# Patient Record
Sex: FEMALE | Race: WHITE | Employment: OTHER | URBAN - METROPOLITAN AREA
[De-identification: names, ages, dates, MRNs, and addresses within clinical notes are randomized per-mention and may not be internally consistent; named-entity substitution may affect disease eponyms.]

---

## 2017-01-18 ENCOUNTER — APPOINTMENT (OUTPATIENT)
Dept: GENERAL RADIOLOGY | Facility: CLINIC | Age: 67
End: 2017-01-18
Attending: EMERGENCY MEDICINE
Payer: COMMERCIAL

## 2017-01-18 ENCOUNTER — OFFICE VISIT (OUTPATIENT)
Dept: FAMILY MEDICINE | Facility: CLINIC | Age: 67
End: 2017-01-18
Payer: COMMERCIAL

## 2017-01-18 ENCOUNTER — HOSPITAL ENCOUNTER (EMERGENCY)
Facility: CLINIC | Age: 67
Discharge: HOME | End: 2017-01-18
Attending: EMERGENCY MEDICINE | Admitting: EMERGENCY MEDICINE
Payer: COMMERCIAL

## 2017-01-18 VITALS
HEART RATE: 131 BPM | HEIGHT: 59 IN | WEIGHT: 122 LBS | SYSTOLIC BLOOD PRESSURE: 144 MMHG | DIASTOLIC BLOOD PRESSURE: 60 MMHG | BODY MASS INDEX: 24.6 KG/M2 | OXYGEN SATURATION: 92 % | TEMPERATURE: 98 F

## 2017-01-18 VITALS
RESPIRATION RATE: 18 BRPM | OXYGEN SATURATION: 91 % | DIASTOLIC BLOOD PRESSURE: 81 MMHG | TEMPERATURE: 97.8 F | WEIGHT: 109.79 LBS | SYSTOLIC BLOOD PRESSURE: 150 MMHG | HEART RATE: 115 BPM | BODY MASS INDEX: 21.99 KG/M2

## 2017-01-18 DIAGNOSIS — R00.0 TACHYCARDIA: ICD-10-CM

## 2017-01-18 DIAGNOSIS — L03.039: ICD-10-CM

## 2017-01-18 DIAGNOSIS — R91.8 PULMONARY NODULES: ICD-10-CM

## 2017-01-18 DIAGNOSIS — L03.011 FELON OF FINGER OF RIGHT HAND: Primary | ICD-10-CM

## 2017-01-18 DIAGNOSIS — R03.0 ELEVATED BLOOD PRESSURE READING WITHOUT DIAGNOSIS OF HYPERTENSION: ICD-10-CM

## 2017-01-18 DIAGNOSIS — R09.81 NASAL CONGESTION: ICD-10-CM

## 2017-01-18 DIAGNOSIS — J06.9 ACUTE URI: ICD-10-CM

## 2017-01-18 DIAGNOSIS — R05.9 COUGH: ICD-10-CM

## 2017-01-18 PROCEDURE — 99213 OFFICE O/P EST LOW 20 MIN: CPT | Performed by: PHYSICIAN ASSISTANT

## 2017-01-18 PROCEDURE — 99283 EMERGENCY DEPT VISIT LOW MDM: CPT | Mod: 25

## 2017-01-18 PROCEDURE — 25000132 ZZH RX MED GY IP 250 OP 250 PS 637: Performed by: EMERGENCY MEDICINE

## 2017-01-18 PROCEDURE — 71020 XR CHEST 2 VW: CPT

## 2017-01-18 PROCEDURE — 10060 I&D ABSCESS SIMPLE/SINGLE: CPT

## 2017-01-18 PROCEDURE — 87070 CULTURE OTHR SPECIMN AEROBIC: CPT | Performed by: EMERGENCY MEDICINE

## 2017-01-18 RX ORDER — CEPHALEXIN 500 MG/1
500 CAPSULE ORAL ONCE
Status: COMPLETED | OUTPATIENT
Start: 2017-01-18 | End: 2017-01-18

## 2017-01-18 RX ORDER — CEPHALEXIN 500 MG/1
500 CAPSULE ORAL 4 TIMES DAILY
Qty: 40 CAPSULE | Refills: 0 | Status: SHIPPED | OUTPATIENT
Start: 2017-01-18 | End: 2017-01-28

## 2017-01-18 RX ADMIN — CEPHALEXIN 500 MG: 500 CAPSULE ORAL at 12:51

## 2017-01-18 ASSESSMENT — ENCOUNTER SYMPTOMS: SHORTNESS OF BREATH: 0

## 2017-01-18 NOTE — ED AVS SNAPSHOT
[facility] Department    [address]    [facility] [address]    Phone:  [phone]    Fax:  [phone]                                       Frida Iglesias   MRN: [id number]    Department:  [facility] Emergency Department   Date of Visit:  1/18/2017           Patient Information     Date Of Birth          1950        Your diagnoses for this visit were:     Paronychia, unspecified laterality     Subungual abscess of finger, unspecified laterality     Acute URI     Pulmonary nodules        You were seen by Herrera Abrams MD.      Follow-up Information     Follow up with Sean Alexandra MD. Call in 3 days.    Specialty:  Orthopedics    Contact information:    [facility]  [address]  [facility] [address]  [phone]          Discharge Instructions         Paronychia of the Finger or Toe  Paronychia is an infection near a fingernail or toenail. It usually occurs when an opening in the cuticle or an ingrown toenail lets bacteria under the skin.  The infection will need to be drained if pus is present. If the infection has been caught early, you may need only antibiotic treatment. Healing will take about 1 to 2 weeks.  Home care  Follow these guidelines when caring for yourself at home:    Clean and soak the toe or finger. Do this twice a day for the first 3 days. To do so:    Soak your foot or hand in a tub of warm water for 5 minutes. Or hold your toe or finger under a faucet of warm running water for 5 minutes.    Clean any crust away with soap and water using a cotton swab.    Put antibiotic ointment on the infected area.    Change the dressing daily or any time it becomes soiled.    If you were given antibiotics, take them as directed until they are all gone.    If your infection is on a toe, wear comfortable shoes with a lot of toe room. You can also wear open-toed sandals while your toe heals.    You may use acetaminophen or  ibuprofen to help with pain, unless another medicine was prescribed. If you have chronic liver or kidney disease, talk with your health care provider before using these medicines. Also talk with your provider if you've had a stomach ulcer or GI bleeding.  Prevention  The following can prevent paronychia:    Trim or push down the skin around the nail (cuticle).    Don't bite your nails.    Don't suck on your thumbs or fingers.  Follow-up care  Follow up with your health care provider, or as advised.  When to seek medical advice  Call your health care provider right away if any of these occur:    Redness, pain, or swelling of the finger or toe gets worse    Red streaks in the skin leading away from the wound    Pus or fluid drain from the nail area    Fever of 100.4 F (38 C) or higher, or as directed by your health care provider    4469-9218 [employer]. [address] [place] [address]. All rights reserved. This information is not intended as a substitute for professional medical care. Always follow your healthcare professional's instructions.        Discharge Instructions  Cellulitis    Cellulitis is an infection of the skin that occurs when bacteria enter the skin.   Symptoms are generally redness, swelling, warmth and pain.  Your infection appeared to be appropriate to treat at home with antibiotics.  However, sometimes your infection may be worse than it seemed at first, or may worsen with time. If you have new or worse symptoms, you may need to be seen again in the Emergency Department or by your primary doctor.    Return to the Emergency Department if:    The redness, pain, or swelling gets a lot worse.  If the red area was marked, return if it is red beyond the marked area.    You are unable to get your antibiotics, or are vomiting them up or you can t take them.    You are feeling more ill, weak or lightheaded.    You start to run a new fever (temperature >101).    Anything else about  "the infection worries or concerns you.  Treatment:    Start your antibiotics right away, and take them as prescribed. Be sure to finish the whole prescription, even if you are better.    Apply a heating pad, warm packs, or warm water soaks to the infected area for 15 minutes at a time, at least 3 times a day. Do not use a heating pad on your feet or legs if you have diabetes. Do not sleep with a heating pad on, since this can cause burns or skin injury.    Rest your injured area for at least 1-2 days. After that you may start using your extremity again as long as there is not too much pain.     Raise the injured area above the level of your heart as much as possible in the first 1-2 days.    Tylenol  (acetaminophen), Motrin  (ibuprofen), or Advil  (ibuprofen) may help may help reduce pain and fever and may help you feel more comfortable. Be sure to read and follow the package directions, and ask your doctor if you have questions.    Follow-up with your doctor:    Re-check in clinic within 2-3 days.  Probiotics: If you have been given an antibiotic, you may want to also take a probiotic pill or eat yogurt with live cultures. Probiotics have \"good bacteria\" to help your intestines stay healthy. Studies have shown that probiotics help prevent diarrhea and other intestine problems (including C. diff infection) when you take antibiotics. You can buy these without a prescription in the pharmacy section of the store.     If you were given a prescription for medicine here today, be sure to read all of the information (including the package insert) that comes with your prescription.  This will include important information about the medicine, its side effects, and any warnings that you need to know about.  The pharmacist who fills the prescription can provide more information and answer questions you may have about the medicine.  If you have questions or concerns that the pharmacist cannot address, please call or return to " the Emergency Department.     Opioid Medication Information    Pain medications are among the most commonly prescribed medicines, so we are including this information for all our patients. If you did not receive pain medication or get a prescription for pain medicine, you can ignore it.     You may have been given a prescription for an opioid (narcotic) pain medicine and/or have received a pain medicine while here in the Emergency Department. These medicines can make you drowsy or impaired. You must not drive, operate dangerous equipment, or engage in any other dangerous activities while taking these medications. If you drive while taking these medications, you could be arrested for DUI, or driving under the influence. Do not drink any alcohol while you are taking these medications.     Opioid pain medications can cause addiction. If you have a history of chemical dependency of any type, you are at a higher risk of becoming addicted to pain medications.  Only take these prescribed medications to treat your pain when all other options have been tried. Take it for as short a time and as few doses as possible. Store your pain pills in a secure place, as they are frequently stolen and provide a dangerous opportunity for children or visitors in your house to start abusing these powerful medications. We will not replace any lost or stolen medicine.  As soon as your pain is better, you should flush all your remaining medication.     Many prescription pain medications contain Tylenol  (acetaminophen), including Vicodin , Tylenol #3 , Norco , Lortab , and Percocet .  You should not take any extra pills of Tylenol  if you are using these prescription medications or you can get very sick.  Do not ever take more than 3000 mg of acetaminophen in any 24 hour period.    All opioids tend to cause constipation. Drink plenty of water and eat foods that have a lot of fiber, such as fruits, vegetables, prune juice, apple juice and high  fiber cereal.  Take a laxative if you don t move your bowels at least every other day. Miralax , Milk of Magnesia, Colace , or Senna  can be used to keep you regular.      Remember that you can always come back to the Emergency Department if you are not able to see your regular doctor in the amount of time listed above, if you get any new symptoms, or if there is anything that worries you.        Discharge References/Attachments     PULMONARY NODULE, SOLITARY (ENGLISH)      24 Hour Appointment Hotline       To make an appointment at any [facility], call [phone] ([phone]). If you don't have a family doctor or clinic, we will help you find one. [facility] are conveniently located to serve the needs of you and your family.             Review of your medicines      START taking        Dose / Directions Last dose taken    cephALEXin 500 MG capsule   Commonly known as:  KEFLEX   Dose:  500 mg   Quantity:  40 capsule        Take 1 capsule (500 mg) by mouth 4 times daily for 10 days   Refills:  0                Prescriptions were sent or printed at these locations (1 Prescription)                   Other Prescriptions                Printed at Department/Unit printer (1 of 1)         cephALEXin (KEFLEX) 500 MG capsule                Procedures and tests performed during your visit     Chest XR,  PA & LAT    Wound Culture Aerobic Bacterial      Orders Needing Specimen Collection     None      Pending Results     Date and Time Order Name Status Description    1/18/2017 1240 Wound Culture Aerobic Bacterial In process             Pending Culture Results     Date and Time Order Name Status Description    1/18/2017 1240 Wound Culture Aerobic Bacterial In process        Test Results from your hospital stay           1/18/2017  1:01 PM - Interface, Flexilab Results         1/18/2017  1:38 PM - Interface, Radiant Ib      Narrative     CHEST TWO VIEWS January 18, 2017 1:26 PM     HISTORY:  Cough.    COMPARISON: None.        Impression     IMPRESSION: 5 mm calcified granuloma in the left midlung. Chest  otherwise negative.     CHARO MARTINS MD                Clinical Quality Measure: Blood Pressure Screening     Your blood pressure was checked while you were in the emergency department today. The last reading we obtained was  BP: 160/80 mmHg . Please read the guidelines below about what these numbers mean and what you should do about them.  If your systolic blood pressure (the top number) is less than 120 and your diastolic blood pressure (the bottom number) is less than 80, then your blood pressure is normal. There is nothing more that you need to do about it.  If your systolic blood pressure (the top number) is 120-139 or your diastolic blood pressure (the bottom number) is 80-89, your blood pressure may be higher than it should be. You should have your blood pressure rechecked within a year by a primary care provider.  If your systolic blood pressure (the top number) is 140 or greater or your diastolic blood pressure (the bottom number) is 90 or greater, you may have high blood pressure. High blood pressure is treatable, but if left untreated over time it can put you at risk for heart attack, stroke, or kidney failure. You should have your blood pressure rechecked by a primary care provider within the next 4 weeks.  If your provider in the emergency department today gave you specific instructions to follow-up with your doctor or provider even sooner than that, you should follow that instruction and not wait for up to 4 weeks for your follow-up visit.        Thank you for choosing [facility]       Thank you for choosing [facility] for your care. Our goal is always to provide you with excellent care. Hearing back from our patients is one way we can continue to improve our services. Please take a few minutes to complete the written survey that you may receive in the mail after you visit with us. Thank  "you!        [email or web address] Information     [email or web address] lets you send messages to your doctor, view your test results, renew your prescriptions, schedule appointments and more. To sign up, go to [email or web address] . Click on \"Log in\" on the left side of the screen, which will take you to the Welcome page. Then click on \"Sign up Now\" on the right side of the page.     You will be asked to enter the access code listed below, as well as some personal information. Please follow the directions to create your username and password.     Your access code is: [email or web address]  Expires: 2017 12:48 PM     Your access code will  in 90 days. If you need help or a new code, please call your [facility] clinic or [phone].        Care EveryWhere ID     This is your Care EveryWhere ID. This could be used by other organizations to access your [facility] medical records  [id number]        After Visit Summary       This is your record. Keep this with you and show to your community pharmacist(s) and doctor(s) at your next visit.                  "

## 2017-01-18 NOTE — DISCHARGE INSTRUCTIONS
Paronychia of the Finger or Toe  Paronychia is an infection near a fingernail or toenail. It usually occurs when an opening in the cuticle or an ingrown toenail lets bacteria under the skin.  The infection will need to be drained if pus is present. If the infection has been caught early, you may need only antibiotic treatment. Healing will take about 1 to 2 weeks.  Home care  Follow these guidelines when caring for yourself at home:    Clean and soak the toe or finger. Do this twice a day for the first 3 days. To do so:    Soak your foot or hand in a tub of warm water for 5 minutes. Or hold your toe or finger under a faucet of warm running water for 5 minutes.    Clean any crust away with soap and water using a cotton swab.    Put antibiotic ointment on the infected area.    Change the dressing daily or any time it becomes soiled.    If you were given antibiotics, take them as directed until they are all gone.    If your infection is on a toe, wear comfortable shoes with a lot of toe room. You can also wear open-toed sandals while your toe heals.    You may use acetaminophen or ibuprofen to help with pain, unless another medicine was prescribed. If you have chronic liver or kidney disease, talk with your health care provider before using these medicines. Also talk with your provider if you've had a stomach ulcer or GI bleeding.  Prevention  The following can prevent paronychia:    Trim or push down the skin around the nail (cuticle).    Don't bite your nails.    Don't suck on your thumbs or fingers.  Follow-up care  Follow up with your health care provider, or as advised.  When to seek medical advice  Call your health care provider right away if any of these occur:    Redness, pain, or swelling of the finger or toe gets worse    Red streaks in the skin leading away from the wound    Pus or fluid drain from the nail area    Fever of 100.4 F (38 C) or higher, or as directed by your health care provider    [id number]  [employer]. [address] [place] [address], [place] [address]. All rights reserved. This information is not intended as a substitute for professional medical care. Always follow your healthcare professional's instructions.        Discharge Instructions  Cellulitis    Cellulitis is an infection of the skin that occurs when bacteria enter the skin.   Symptoms are generally redness, swelling, warmth and pain.  Your infection appeared to be appropriate to treat at home with antibiotics.  However, sometimes your infection may be worse than it seemed at first, or may worsen with time. If you have new or worse symptoms, you may need to be seen again in the Emergency Department or by your primary doctor.    Return to the Emergency Department if:    The redness, pain, or swelling gets a lot worse.  If the red area was marked, return if it is red beyond the marked area.    You are unable to get your antibiotics, or are vomiting them up or you can t take them.    You are feeling more ill, weak or lightheaded.    You start to run a new fever (temperature >101).    Anything else about the infection worries or concerns you.  Treatment:    Start your antibiotics right away, and take them as prescribed. Be sure to finish the whole prescription, even if you are better.    Apply a heating pad, warm packs, or warm water soaks to the infected area for 15 minutes at a time, at least 3 times a day. Do not use a heating pad on your feet or legs if you have diabetes. Do not sleep with a heating pad on, since this can cause burns or skin injury.    Rest your injured area for at least 1-2 days. After that you may start using your extremity again as long as there is not too much pain.     Raise the injured area above the level of your heart as much as possible in the first 1-2 days.    Tylenol  (acetaminophen), Motrin  (ibuprofen), or Advil  (ibuprofen) may help may help reduce pain and fever and may help you feel more comfortable. Be  "sure to read and follow the package directions, and ask your doctor if you have questions.    Follow-up with your doctor:    Re-check in clinic within 2-3 days.  Probiotics: If you have been given an antibiotic, you may want to also take a probiotic pill or eat yogurt with live cultures. Probiotics have \"good bacteria\" to help your intestines stay healthy. Studies have shown that probiotics help prevent diarrhea and other intestine problems (including C. diff infection) when you take antibiotics. You can buy these without a prescription in the pharmacy section of the store.     If you were given a prescription for medicine here today, be sure to read all of the information (including the package insert) that comes with your prescription.  This will include important information about the medicine, its side effects, and any warnings that you need to know about.  The pharmacist who fills the prescription can provide more information and answer questions you may have about the medicine.  If you have questions or concerns that the pharmacist cannot address, please call or return to the Emergency Department.     Opioid Medication Information    Pain medications are among the most commonly prescribed medicines, so we are including this information for all our patients. If you did not receive pain medication or get a prescription for pain medicine, you can ignore it.     You may have been given a prescription for an opioid (narcotic) pain medicine and/or have received a pain medicine while here in the Emergency Department. These medicines can make you drowsy or impaired. You must not drive, operate dangerous equipment, or engage in any other dangerous activities while taking these medications. If you drive while taking these medications, you could be arrested for DUI, or driving under the influence. Do not drink any alcohol while you are taking these medications.     Opioid pain medications can cause addiction. If you have " a history of chemical dependency of any type, you are at a higher risk of becoming addicted to pain medications.  Only take these prescribed medications to treat your pain when all other options have been tried. Take it for as short a time and as few doses as possible. Store your pain pills in a secure place, as they are frequently stolen and provide a dangerous opportunity for children or visitors in your house to start abusing these powerful medications. We will not replace any lost or stolen medicine.  As soon as your pain is better, you should flush all your remaining medication.     Many prescription pain medications contain Tylenol  (acetaminophen), including Vicodin , Tylenol #3 , Norco , Lortab , and Percocet .  You should not take any extra pills of Tylenol  if you are using these prescription medications or you can get very sick.  Do not ever take more than 3000 mg of acetaminophen in any 24 hour period.    All opioids tend to cause constipation. Drink plenty of water and eat foods that have a lot of fiber, such as fruits, vegetables, prune juice, apple juice and high fiber cereal.  Take a laxative if you don t move your bowels at least every other day. Miralax , Milk of Magnesia, Colace , or Senna  can be used to keep you regular.      Remember that you can always come back to the Emergency Department if you are not able to see your regular doctor in the amount of time listed above, if you get any new symptoms, or if there is anything that worries you.

## 2017-01-18 NOTE — PROGRESS NOTES
SUBJECTIVE:                                                    Frida Iglesias is a 66 year old female who presents to clinic today for the following health issues:      Acute Illness   Acute illness concerns: cough, congestion  Onset: 9 days     Fever: no    Chills/Sweats: YES, chills    Headache (location?): YES    Sinus Pressure:no    Conjunctivitis:  no    Ear Pain: no    Rhinorrhea: no    Congestion: YES    Sore Throat: no     Cough: YES    Wheeze: YES    Decreased Appetite: no    Nausea: YES    Vomiting: no    Diarrhea:  no    Dysuria/Freq.: no    Fatigue/Achiness: YES    Sick/Strep Exposure: no     Therapies Tried and outcome:       Concern - started a couple of days ago - thumb very painful, swollen, discolored. Feeling hot/chilled, but no known fevers.  Pain is keeping her up at night.  Is a ruvalcaba and injured nail during summer about 6 months ago. Nail has fallen off a couple times since, but ulnar aspect never seems to totally adhere and there is a white spot under nail that seems to come/go.  This time white area has enlarged without improvement. Sx crescendoing.     Onset:     Description:   Nail had previously fallen off in the summer but in the past couple of days it has become very painful, swollen, discolored    Intensity:     Progression of Symptoms:  worsening    Accompanying Signs & Symptoms:         Previous history of similar problem:       Precipitating factors:   Worsened by:     Alleviating factors:  Improved by:        Therapies Tried and outcome: warm soaks    Problem list and histories reviewed & adjusted, as indicated.  Additional history: as documented  Problem list, Medication list, Allergies, and Medical/Social/Surgical histories reviewed in [facility] and updated as appropriate.    No current outpatient prescriptions on file.     No current facility-administered medications for this visit.      No Known Allergies     O:  /60 mmHg  Pulse 131  Temp(Src) 98  F (36.7  C) (Oral)  Ht  "4' 11.25\" (1.505 m)  Wt 122 lb (55.339 kg)  BMI 24.43 kg/m2  SpO2 92%  Breastfeeding? No  Constitutional: Pt is a pleasant 67 yo female who appears mildly anxious.  MSK: shows me an obviously swollen, red and hot to touch distal R thumb. There is yellow-green pustular fluid encompassing the entire half of the ulnar aspect her nail. There is ecchymosis along adjacent nail border from tip of ulnar side to inferior aspect of nail. Distal thumb skin is tight and palpation of distal phalanx is very tender. Continues to be tender to palpation of IPJ, but less so. Also reports she can feel pain radiation to webspace and thenar eminence feels a little tingly at times.    A/P:    ICD-10-CM    1. Felon of finger of right hand L03.011    2. Tachycardia R00.0    3. Elevated blood pressure reading without diagnosis of hypertension R03.0    4. Cough R05    5. Nasal congestion R09.81    After finger examination further exam was not completed and I contacted the ED given concerns that this needs surgical I&D.  Concerns of more sinister brewing process in light of tachycardia and elevated BP as well, but she is afebrile.  Spoke with Dr. Chuns at [facility] who agrees with ED evaluation and further treatment and can also evaluate her URI sx at time of same.  Could give consideration to further consult with hand surgeon if indicated at that time.  Pt in agreement with plan and will present by private car.    Electronically Signed By: Sobeida Iraheta PA-C        "

## 2017-01-18 NOTE — ED NOTES
ABC's intact.  Alert and oriented x4.    Pt states she has a history of losing L thumb nail.  Starting yesterday, her L thumb began to swell and throb.  Thumb appears swollen and reddened with ecchymotic area near 1st joint.  CMS intact.

## 2017-01-18 NOTE — ED PROVIDER NOTES
History     Chief Complaint:  Hand Pain      HPI   Frida Iglesias is a 66 year old female who presents with left hand pain. The patient reports yesterday she developed swelling to her left thumb which has been progressively worsening. Patient was concerned for her symptoms today which prompts her visit. She notes she has lost the nail on this finger in the past. She denies any recent injury. She also notes of a cough starting last week which has been improving. She denies chest pain or shortness of breath.     Allergies:  The patient has no known allergies to medications.      Medications:    The patient is not currently taking any prescribed medications.     Past Medical History:    The patient does not have any pertinent past medical history.     Past Surgical History:    The patient has no pertinent surgical history.     Family History:  The patient has no pertinent family history.    Social History:  .  The patient is a former smoker, 1.50 ppd for 10 years.     Review of Systems   Respiratory: Negative for shortness of breath.    Cardiovascular: Negative for chest pain.   Skin:        Positive for left thumb swelling and pain.   All other systems reviewed and are negative.    Physical Exam     Patient Vitals for the past 24 hrs:   BP Temp Temp src Pulse Resp SpO2 Weight   01/18/17 1315 - - - - - 94 % -   01/18/17 1302 - - - - - 94 % -   01/18/17 1300 160/80 mmHg - - - - - -   01/18/17 1116 (!) 166/9 mmHg 97.8  F (36.6  C) Oral 115 18 92 % 49.8 kg (109 lb 12.6 oz)        Physical Exam  Constitutional:  Oriented to person, place, and time. Appears well-developed.      No distress.   HENT:   Head:    Normocephalic.    Eyes:    EOM are normal.   Neck:    Neck supple.    Musculoskeletal:  2+ distal pulses. She has a right thumb medial subungual abscess paronychia just proximal to nail, ecchymosis noted. Erythema circumferential around thumb with swelling.   Neurological:   Alert and oriented to person,  place, and time.           Moves all 4 extremities spontaneously. Left thumb neurovascularly intact.   Skin:    Paroncyhia, subungual abscess, and erythema of right thumb..    Emergency Department Course   Laboratory:  Wound culture: in process    Procedures:  Paronychia incision & drainage     INDICATION: paronychia    LOCATION: right thumb    CONSENT: The patient was verbally consented regarding the risks, benefits of the procedure.    TECHNIQUE:  After sterile prep with betadine, a paronychia incision (along the margin of the nail and lateral nail fold at the areas of maximum swelling and fluctuance) was made and loculations were disrupted, followed by an incision at the proximal and lateral aspect of the nail fold to faciliate drainage of the abscess. The wound was irrigated and soaked in copious tap water. The patient tolerated the procedure well without difficulty.  There were no complications.    Nail trephination  Indication: subungal abscess  The risks and benefits of the procedure were discussed with the patient.   Narrative: The nail was cleaned with an alcohol swab.  An cautery was used to trephinate the nail.  A satisfactory amount of purulent drainage was expressed.  Pain was improved following the procedure.    Interventions:  Keflex 500 mg tablet    Emergency Department Course:  Nursing notes and vitals reviewed.  I performed an exam of the patient as documented above.     The procedure detailed above was performed on the patient and she tolerated this well.     Patient swabbed. This was sent to the lab for further testing.    The patient received the intervention(s) above.     The above imaging study(s) were ordered with results noted above.     Findings and plan explained to the Patient. Patient discharged home with instructions regarding supportive care, medications, and reasons to return. The importance of close follow-up was reviewed.     Impression & Plan    Medical Decision Making:  Frida  Kelsie is a 66 year old female that came in complaining of redness and swelling to right thumb. She does have a paronychia with subungual abscess with surrounded cellulitis. She does have a hematoma just proximal to her nail. She is afebrile and well appearing here. No crepitus. No signs of necrotizing fascitis and I would highly doubt this. I did end up draining her paronychia and subungual abscess, see procedute notes above, with wond cultures pending. Since she has surrounding cellulitis she will be started on keflex. Due to unusual nature and cellulitis I am going to have her follow up with hand surgery for reevlaaution.     Additionally, patient was noted to have oxygen sat's of 92%. A chest x-ray was obtained which shows a 5 mm calcified granuloma in the left mid lung but otherwise unremarkable. The findings were discussed with the patient and I advised her to follow up as an outpatient regarding this. She is asymptomatic and denies feeling sob or having any chest pain. She is told to return here for any expanding redness, fever, chest pain, shortness of breath, worsening pain, or other concerns.     Diagnosis:    ICD-10-CM    1. Paronychia, unspecified laterality L03.019 Wound Culture Aerobic Bacterial   2. Subungual abscess of finger, unspecified laterality L03.039    3. Acute URI J06.9    4. Pulmonary nodules R91.8        Disposition:  Home. Follow up with PMD and orthopedics as warranted.    Discharge Medications:  New Prescriptions    CEPHALEXIN (KEFLEX) 500 MG CAPSULE    Take 1 capsule (500 mg) by mouth 4 times daily for 10 days         Joselito GUALLPA, am serving as a scribe at 1:19 PM on 1/18/2017 to document services personally performed by Dr. Abrams, based on my observations and the provider's statements to me.    [facility] EMERGENCY DEPARTMENT        Herrera Abrams MD  01/18/17 7805

## 2017-01-18 NOTE — ED AVS SNAPSHOT
[facility] Emergency Department    [address]    [facility] [address]    Phone:  [phone]    Fax:  [phone]                                       Frida Iglesias   MRN: [id number]    Department:  [facility] Emergency Department   Date of Visit:  1/18/2017           After Visit Summary Signature Page     I have received my discharge instructions, and my questions have been answered. I have discussed any challenges I see with this plan with the nurse or doctor.    ..........................................................................................................................................  Patient/Patient Representative Signature      ..........................................................................................................................................  Patient Representative Print Name and Relationship to Patient    ..................................................               ................................................  Date                                            Time    ..........................................................................................................................................  Reviewed by Signature/Title    ...................................................              ..............................................  Date                                                            Time

## 2017-01-21 ENCOUNTER — HOSPITAL ENCOUNTER (EMERGENCY)
Facility: CLINIC | Age: 67
Discharge: HOME | End: 2017-01-21
Attending: EMERGENCY MEDICINE | Admitting: EMERGENCY MEDICINE
Payer: COMMERCIAL

## 2017-01-21 VITALS
DIASTOLIC BLOOD PRESSURE: 63 MMHG | HEART RATE: 92 BPM | BODY MASS INDEX: 22.13 KG/M2 | HEIGHT: 59 IN | SYSTOLIC BLOOD PRESSURE: 106 MMHG | WEIGHT: 109.79 LBS | TEMPERATURE: 98 F | OXYGEN SATURATION: 95 % | RESPIRATION RATE: 18 BRPM

## 2017-01-21 DIAGNOSIS — L03.012 PARONYCHIA, LEFT: ICD-10-CM

## 2017-01-21 LAB
BACTERIA SPEC CULT: NORMAL
MICRO REPORT STATUS: NORMAL
SPECIMEN SOURCE: NORMAL

## 2017-01-21 PROCEDURE — 10060 I&D ABSCESS SIMPLE/SINGLE: CPT

## 2017-01-21 PROCEDURE — 99283 EMERGENCY DEPT VISIT LOW MDM: CPT | Mod: 25

## 2017-01-21 RX ORDER — GINSENG 100 MG
CAPSULE ORAL
Status: DISCONTINUED
Start: 2017-01-21 | End: 2017-01-21 | Stop reason: HOSPADM

## 2017-01-21 ASSESSMENT — ENCOUNTER SYMPTOMS
FEVER: 0
JOINT SWELLING: 1
ARTHRALGIAS: 1

## 2017-01-21 NOTE — ED NOTES
Left thumb pain, swelling.  Was seen here a few days ago, started on antibiotics, not getting better.  ABCDs intact.

## 2017-01-21 NOTE — DISCHARGE INSTRUCTIONS
Paronychia of the Finger or Toe  Paronychia is an infection near a fingernail or toenail. It usually occurs when an opening in the cuticle or an ingrown toenail lets bacteria under the skin.  The infection will need to be drained if pus is present. If the infection has been caught early, you may need only antibiotic treatment. Healing will take about 1 to 2 weeks.  Home care  Follow these guidelines when caring for yourself at home:    Clean and soak the toe or finger. Do this twice a day for the first 3 days. To do so:    Soak your foot or hand in a tub of warm water for 5 minutes. Or hold your toe or finger under a faucet of warm running water for 5 minutes.    Clean any crust away with soap and water using a cotton swab.    Put antibiotic ointment on the infected area.    Change the dressing daily or any time it becomes soiled.    If you were given antibiotics, take them as directed until they are all gone.    If your infection is on a toe, wear comfortable shoes with a lot of toe room. You can also wear open-toed sandals while your toe heals.    You may use acetaminophen or ibuprofen to help with pain, unless another medicine was prescribed. If you have chronic liver or kidney disease, talk with your health care provider before using these medicines. Also talk with your provider if you've had a stomach ulcer or GI bleeding.  Prevention  The following can prevent paronychia:    Trim or push down the skin around the nail (cuticle).    Don't bite your nails.    Don't suck on your thumbs or fingers.  Follow-up care  Follow up with your health care provider, or as advised.  When to seek medical advice  Call your health care provider right away if any of these occur:    Redness, pain, or swelling of the finger or toe gets worse    Red streaks in the skin leading away from the wound    Pus or fluid drain from the nail area    Fever of 100.4 F (38 C) or higher, or as directed by your health care provider    1625-9954  [employer], "[employer]. [address] [place] [address], [place] [address]. All rights reserved. This information is not intended as a substitute for professional medical care. Always follow your healthcare professional's instructions.

## 2017-01-21 NOTE — ED AVS SNAPSHOT
[facility] Emergency Department    [address]    [facility] [address]    Phone:  [phone]    Fax:  [phone]                                       Frida Iglesias   MRN: [id number]    Department:  [facility] Emergency Department   Date of Visit:  1/21/2017           After Visit Summary Signature Page     I have received my discharge instructions, and my questions have been answered. I have discussed any challenges I see with this plan with the nurse or doctor.    ..........................................................................................................................................  Patient/Patient Representative Signature      ..........................................................................................................................................  Patient Representative Print Name and Relationship to Patient    ..................................................               ................................................  Date                                            Time    ..........................................................................................................................................  Reviewed by Signature/Title    ...................................................              ..............................................  Date                                                            Time

## 2017-01-21 NOTE — ED AVS SNAPSHOT
[facility] Emergency Department    [address] [place] [address]    Phone:  [phone]    Fax:  [phone]                                       Frida Iglesias   MRN: [id number]    Department:  [facility] Emergency Department   Date of Visit:  1/21/2017           Patient Information     Date Of Birth          1950        Your diagnoses for this visit were:     Paronychia, left        You were seen by Kranthi Diop MD.      Follow-up Information     Follow up with hand surgeon .    Why:  as scheduled        Discharge Instructions         Paronychia of the Finger or Toe  Paronychia is an infection near a fingernail or toenail. It usually occurs when an opening in the cuticle or an ingrown toenail lets bacteria under the skin.  The infection will need to be drained if pus is present. If the infection has been caught early, you may need only antibiotic treatment. Healing will take about 1 to 2 weeks.  Home care  Follow these guidelines when caring for yourself at home:    Clean and soak the toe or finger. Do this twice a day for the first 3 days. To do so:    Soak your foot or hand in a tub of warm water for 5 minutes. Or hold your toe or finger under a faucet of warm running water for 5 minutes.    Clean any crust away with soap and water using a cotton swab.    Put antibiotic ointment on the infected area.    Change the dressing daily or any time it becomes soiled.    If you were given antibiotics, take them as directed until they are all gone.    If your infection is on a toe, wear comfortable shoes with a lot of toe room. You can also wear open-toed sandals while your toe heals.    You may use acetaminophen or ibuprofen to help with pain, unless another medicine was prescribed. If you have chronic liver or kidney disease, talk with your health care provider before using these medicines. Also talk with your provider if you've had a stomach ulcer or GI  bleeding.  Prevention  The following can prevent paronychia:    Trim or push down the skin around the nail (cuticle).    Don't bite your nails.    Don't suck on your thumbs or fingers.  Follow-up care  Follow up with your health care provider, or as advised.  When to seek medical advice  Call your health care provider right away if any of these occur:    Redness, pain, or swelling of the finger or toe gets worse    Red streaks in the skin leading away from the wound    Pus or fluid drain from the nail area    Fever of 100.4 F (38 C) or higher, or as directed by your health care provider    2655-7141 [employer]. [address] [place] [address], [place] [address]. All rights reserved. This information is not intended as a substitute for professional medical care. Always follow your healthcare professional's instructions.          24 Hour Appointment Hotline       To make an appointment at any [facility], call [phone] ([phone]). If you don't have a family doctor or clinic, we will help you find one. [facility] clinics are conveniently located to serve the needs of you and your family.             Review of your medicines      Our records show that you are taking the medicines listed below. If these are incorrect, please call your family doctor or clinic.        Dose / Directions Last dose taken    cephALEXin 500 MG capsule   Commonly known as:  KEFLEX   Dose:  500 mg   Quantity:  40 capsule        Take 1 capsule (500 mg) by mouth 4 times daily for 10 days   Refills:  0                Orders Needing Specimen Collection     None      Pending Results     No orders found from 1/20/2017 to 1/22/2017.            Pending Culture Results     No orders found from 1/20/2017 to 1/22/2017.             Test Results from your hospital stay            Clinical Quality Measure: Blood Pressure Screening     Your blood pressure was checked while you were in the emergency department today. The last reading we  "obtained was  BP: 149/89 mmHg . Please read the guidelines below about what these numbers mean and what you should do about them.  If your systolic blood pressure (the top number) is less than 120 and your diastolic blood pressure (the bottom number) is less than 80, then your blood pressure is normal. There is nothing more that you need to do about it.  If your systolic blood pressure (the top number) is 120-139 or your diastolic blood pressure (the bottom number) is 80-89, your blood pressure may be higher than it should be. You should have your blood pressure rechecked within a year by a primary care provider.  If your systolic blood pressure (the top number) is 140 or greater or your diastolic blood pressure (the bottom number) is 90 or greater, you may have high blood pressure. High blood pressure is treatable, but if left untreated over time it can put you at risk for heart attack, stroke, or kidney failure. You should have your blood pressure rechecked by a primary care provider within the next 4 weeks.  If your provider in the emergency department today gave you specific instructions to follow-up with your doctor or provider even sooner than that, you should follow that instruction and not wait for up to 4 weeks for your follow-up visit.        Thank you for choosing [facility]       Thank you for choosing [facility] for your care. Our goal is always to provide you with excellent care. Hearing back from our patients is one way we can continue to improve our services. Please take a few minutes to complete the written survey that you may receive in the mail after you visit with us. Thank you!        [employer] Information     [employer] lets you send messages to your doctor, view your test results, renew your prescriptions, schedule appointments and more. To sign up, go to [email or web address] . Click on \"Log in\" on the left side of the screen, which will take you to the Welcome page. Then click on \"Sign up Now\" on " the right side of the page.     You will be asked to enter the access code listed below, as well as some personal information. Please follow the directions to create your username and password.     Your access code is: [email or web address]  Expires: 2017 12:48 PM     Your access code will  in 90 days. If you need help or a new code, please call your [facility] clinic or [phone].        Care EveryWhere ID     This is your Care EveryWhere ID. This could be used by other organizations to access your [facility] medical records  [id number]        After Visit Summary       This is your record. Keep this with you and show to your community pharmacist(s) and doctor(s) at your next visit.

## 2017-01-21 NOTE — ED PROVIDER NOTES
"  History     Chief Complaint:  Swelling in Thumb       The history is provided by the patient.      Frida Iglesias is a 66 year old female who presents with pain and swelling in her left thumb.  Patient was seen in the ED 1/18/17 complaining of swelling to her left thumb after putting her thumb in peroxide 5 days prior, paronychia I&D performed at that time as below.  Patient was started on 10 day course of Keflex (4 days) with first dose in the ED at that time as well.  She returns to the emergency department today complaining of pressure beneath the fingernail and whitish coloration despite taking her antibiotic as prescribed and soaking the finger.  She denies fevers, pain in the thumb, or other complaint.         Allergies:  The patient has no known allergies to medications.      Medications:    Keflex    Past Medical History:     The patient does not have any pertinent past medical history.     Past Surgical History:     The patient has no pertinent surgical history.     Family History:  The patient has no pertinent family history.    Social History:  Presents with daughter   Tobacco use: Former 1.50 PPD for 10 years, QD 1991  Alcohol use: Negative   Marital Status:         Review of Systems   Constitutional: Negative for fever.   Musculoskeletal: Positive for joint swelling and arthralgias.   All other systems reviewed and are negative.      Physical Exam     Patient Vitals for the past 24 hrs:   BP Temp Temp src Pulse Resp SpO2 Height Weight   01/21/17 1241 - - - - - 95 % - -   01/21/17 1240 106/63 mmHg - - - - - - -   01/21/17 1049 149/89 mmHg 98  F (36.7  C) Temporal 92 18 98 % 1.499 m (4' 11\") 49.8 kg (109 lb 12.6 oz)       Physical Exam  General:  No respiratory distress    Cardiovascular: Good cap refill.    Respiratory: Breathing non labored.     Musculoskeletal: No tenderness. No bony deformity.  Left thumb has swelling and erythema at the base of the nailbed.      Skin: No rashes or petechiae "     Neurologic: non focal      Psychiatric: Appropriate      Emergency Department Course   Procedures:  Paronychia incision & drainage      INDICATION: Paronychia    LOCATION: Left thumb    CONSENT: The patient was verbally consented regarding the risks, benefits of the procedure.    TECHNIQUE:  After sterile prep, a paronychia incision with the bevel of a 19 gauge needle (along the margin of the nail and lateral nail fold at the areas of maximum swelling and fluctuance) was made and loculations were disrupted.  The patient tolerated the procedure well without difficulty.  There were no complications.            Emergency Department Course:  Past medical records, nursing notes, and vitals reviewed.  1146: I performed an exam of the patient and obtained history, as documented above.   I&D as above.  1236: I rechecked the patient.  Findings and plan explained to the Patient and daughter. Patient discharged home with instructions regarding supportive care, medications, and reasons to return. The importance of close follow-up was reviewed.      Impression & Plan    Medical Decision Making:  The patient was recently treated for paronychia, I think fluid and infection has since reaccumulated in the area causing another mild paronychia.  Therefore I did incise the area with a needle and expressed pus.  Discussed continuing antibiotic and follow up.  No signs of a felon, cellulitis, or tenosynovitis.  Her pain was under control and she was discharged to close follow up with hand surgeon.      Diagnosis:    ICD-10-CM    1. Paronychia, left L03.012    2. Thumb pain, left    Disposition:  Discharged to home with plan as outlined.      Warren Owen  1/21/2017   [facility] EMERGENCY DEPARTMENT    IWarren, am serving as a scribe at 11:46 AM on 1/21/2017 to document services personally performed by Kranthi Diop MD based on my observations and the provider's statements to me.      Jadon  Kranthi ALLRED MD  01/22/17 5561

## 2017-06-05 ENCOUNTER — OFFICE VISIT (OUTPATIENT)
Dept: FAMILY MEDICINE | Facility: CLINIC | Age: 67
End: 2017-06-05
Payer: COMMERCIAL

## 2017-06-05 VITALS
BODY MASS INDEX: 24.8 KG/M2 | HEIGHT: 59 IN | TEMPERATURE: 98.4 F | WEIGHT: 123 LBS | DIASTOLIC BLOOD PRESSURE: 74 MMHG | SYSTOLIC BLOOD PRESSURE: 150 MMHG | OXYGEN SATURATION: 98 % | HEART RATE: 97 BPM

## 2017-06-05 DIAGNOSIS — Z11.59 NEED FOR HEPATITIS C SCREENING TEST: ICD-10-CM

## 2017-06-05 DIAGNOSIS — Z12.31 ENCOUNTER FOR SCREENING MAMMOGRAM FOR BREAST CANCER: ICD-10-CM

## 2017-06-05 DIAGNOSIS — Z00.00 ENCOUNTER FOR WELLNESS EXAMINATION: Primary | ICD-10-CM

## 2017-06-05 DIAGNOSIS — M85.80 OSTEOPENIA: ICD-10-CM

## 2017-06-05 DIAGNOSIS — Z13.6 CARDIOVASCULAR SCREENING; LDL GOAL LESS THAN 160: ICD-10-CM

## 2017-06-05 DIAGNOSIS — Z78.0 POST-MENOPAUSE: ICD-10-CM

## 2017-06-05 DIAGNOSIS — Z86.0100 PERSONAL HISTORY OF COLONIC POLYPS: ICD-10-CM

## 2017-06-05 DIAGNOSIS — Z71.89 ADVANCED DIRECTIVES, COUNSELING/DISCUSSION: ICD-10-CM

## 2017-06-05 DIAGNOSIS — N89.8 VAGINAL IRRITATION: ICD-10-CM

## 2017-06-05 DIAGNOSIS — R03.0 ELEVATED BLOOD PRESSURE READING WITHOUT DIAGNOSIS OF HYPERTENSION: ICD-10-CM

## 2017-06-05 DIAGNOSIS — Z13.1 SCREENING FOR DIABETES MELLITUS: ICD-10-CM

## 2017-06-05 LAB
ALBUMIN SERPL-MCNC: 4 G/DL (ref 3.4–5)
ALP SERPL-CCNC: 100 U/L (ref 40–150)
ALT SERPL W P-5'-P-CCNC: 20 U/L (ref 0–50)
ANION GAP SERPL CALCULATED.3IONS-SCNC: 12 MMOL/L (ref 3–14)
AST SERPL W P-5'-P-CCNC: 14 U/L (ref 0–45)
BILIRUB SERPL-MCNC: 0.5 MG/DL (ref 0.2–1.3)
BUN SERPL-MCNC: 13 MG/DL (ref 7–30)
CALCIUM SERPL-MCNC: 9 MG/DL (ref 8.5–10.1)
CHLORIDE SERPL-SCNC: 103 MMOL/L (ref 94–109)
CHOLEST SERPL-MCNC: 252 MG/DL
CO2 SERPL-SCNC: 26 MMOL/L (ref 20–32)
CREAT SERPL-MCNC: 0.57 MG/DL (ref 0.52–1.04)
GFR SERPL CREATININE-BSD FRML MDRD: NORMAL ML/MIN/1.7M2
GLUCOSE SERPL-MCNC: 88 MG/DL (ref 70–99)
HDLC SERPL-MCNC: 55 MG/DL
LDLC SERPL CALC-MCNC: 172 MG/DL
MICRO REPORT STATUS: NORMAL
NONHDLC SERPL-MCNC: 197 MG/DL
POTASSIUM SERPL-SCNC: 3.6 MMOL/L (ref 3.4–5.3)
PROT SERPL-MCNC: 8.2 G/DL (ref 6.8–8.8)
SODIUM SERPL-SCNC: 141 MMOL/L (ref 133–144)
SPECIMEN SOURCE: NORMAL
TRIGL SERPL-MCNC: 126 MG/DL
WET PREP SPEC: NORMAL

## 2017-06-05 PROCEDURE — G0438 PPPS, INITIAL VISIT: HCPCS | Performed by: PHYSICIAN ASSISTANT

## 2017-06-05 PROCEDURE — 87210 SMEAR WET MOUNT SALINE/INK: CPT | Performed by: PHYSICIAN ASSISTANT

## 2017-06-05 PROCEDURE — 80053 COMPREHEN METABOLIC PANEL: CPT | Performed by: PHYSICIAN ASSISTANT

## 2017-06-05 PROCEDURE — 86803 HEPATITIS C AB TEST: CPT | Performed by: PHYSICIAN ASSISTANT

## 2017-06-05 PROCEDURE — 80061 LIPID PANEL: CPT | Performed by: PHYSICIAN ASSISTANT

## 2017-06-05 PROCEDURE — 36415 COLL VENOUS BLD VENIPUNCTURE: CPT | Performed by: PHYSICIAN ASSISTANT

## 2017-06-05 NOTE — MR AVS SNAPSHOT
After Visit Summary   6/5/2017    Frida Iglesias    MRN: [id number]           Patient Information     Date Of Birth          1950        Visit Information        Provider Department      6/5/2017 10:40 AM Sobeida Iraheta PA-C [facility]        Today's Diagnoses     Advanced directives, counseling/discussion    -  1    CARDIOVASCULAR SCREENING; LDL GOAL LESS THAN 160        Need for hepatitis C screening test        Screening for diabetes mellitus        Osteopenia        Post-menopause        Personal history of colonic polyps        Encounter for screening mammogram for breast cancer        Vaginal irritation          Care Instructions      Preventive Health Recommendations  Female Ages 65 +    Yearly exam:     See your health care provider every year in order to  o Review health changes.   o Discuss preventive care.    o Review your medicines if your doctor has prescribed any.      You no longer need a yearly Pap test unless you've had an abnormal Pap test in the past 10 years. If you have vaginal symptoms, such as bleeding or discharge, be sure to talk with your provider about a Pap test.      Every 1 to 2 years, have a mammogram.  If you are over 69, talk with your health care provider about whether or not you want to continue having screening mammograms.      Every 10 years, have a colonoscopy. Or, have a yearly FIT test (stool test). These exams will check for colon cancer.       Have a cholesterol test every 5 years, or more often if your doctor advises it.       Have a diabetes test (fasting glucose) every three years. If you are at risk for diabetes, you should have this test more often.       At age 65, have a bone density scan (DEXA) to check for osteoporosis (brittle bone disease).    Shots:    Get a flu shot each year.    Get a tetanus shot every 10 years.    Talk to your doctor about your pneumonia vaccines. There are now two you should receive - Pneumovax  (PPSV 23) and Prevnar (PCV 13).    Talk to your doctor about the shingles vaccine.    Talk to your doctor about the hepatitis B vaccine.    Nutrition:     Eat at least 5 servings of fruits and vegetables each day.      Eat whole-grain bread, whole-wheat pasta and brown rice instead of white grains and rice.      Talk to your provider about Calcium and Vitamin D.     Lifestyle    Exercise at least 150 minutes a week (30 minutes a day, 5 days a week). This will help you control your weight and prevent disease.      Limit alcohol to one drink per day.      No smoking.       Wear sunscreen to prevent skin cancer.       See your dentist twice a year for an exam and cleaning.      See your eye doctor every 1 to 2 years to screen for conditions such as glaucoma, macular degeneration, cataracts, etc           Follow-ups after your visit        Additional Services     HONORING CHOICES REFERRAL       Referral to Advance Directive Counseling:  Basic Advance Care Planning  Declined at this time    The  will call you unless your appointment was made at the clinic. You may also call the  at  if you wish.                  Future tests that were ordered for you today     Open Future Orders        Priority Expected Expires Ordered    *MA Screening Digital Bilateral Routine  6/5/2018 6/5/2017    DX Hip/Pelvis/Spine Routine  6/5/2018 6/5/2017            Who to contact     If you have questions or need follow up information about today's clinic visit or your schedule please contact [facility] directly at [phone].  Normal or non-critical lab and imaging results will be communicated to you by MyChart, letter or phone within 4 business days after the clinic has received the results. If you do not hear from us within 7 days, please contact the clinic through MyChart or phone. If you have a critical or abnormal lab result, we will notify you by phone as soon as possible.  Submit refill requests through  "Leoniet or call your pharmacy and they will forward the refill request to us. Please allow 3 business days for your refill to be completed.          Additional Information About Your Visit        MyChart Information     [employer] lets you send messages to your doctor, view your test results, renew your prescriptions, schedule appointments and more. To sign up, go to [email or web address] . Click on \"Log in\" on the left side of the screen, which will take you to the Welcome page. Then click on \"Sign up Now\" on the right side of the page.     You will be asked to enter the access code listed below, as well as some personal information. Please follow the directions to create your username and password.     Your access code is: [email or web address]  Expires: 9/3/2017 12:00 PM     Your access code will  in 90 days. If you need help or a new code, please call your [place] clinic or [phone].        Care EveryWhere ID     This is your Care EveryWhere ID. This could be used by other organizations to access your [place] medical records  [id number]        Your Vitals Were     Pulse Temperature Height Pulse Oximetry Breastfeeding? BMI (Body Mass Index)    97 98.4  F (36.9  C) (Oral) 4' 11\" (1.499 m) 98% No 24.84 kg/m2       Blood Pressure from Last 3 Encounters:   17 150/74   17 106/63   17 150/81    Weight from Last 3 Encounters:   17 123 lb (55.8 kg)   17 109 lb 12.6 oz (49.8 kg)   17 109 lb 12.6 oz (49.8 kg)              We Performed the Following     Comprehensive metabolic panel (BMP + Alb, Alk Phos, ALT, AST, Total. Bili, TP)     Hepatitis C Screen Reflex to HCV RNA Quant and Genotype     HONORING CHOICES REFERRAL     Lipid Profile with reflex to direct LDL     Wet prep        Primary Care Provider Office Phone # Fax #    Sobeida Iraheta PA-C [phone]       [facility] [address] [facility] [facility] [address]        Thank you!     Thank you for " choosing [facility]  for your care. Our goal is always to provide you with excellent care. Hearing back from our patients is one way we can continue to improve our services. Please take a few minutes to complete the written survey that you may receive in the mail after your visit with us. Thank you!             Your Updated Medication List - Protect others around you: Learn how to safely use, store and throw away your medicines at www.disposemymeds.org.      Notice  As of 6/5/2017 12:00 PM    You have not been prescribed any medications.

## 2017-06-05 NOTE — NURSING NOTE
"Chief Complaint   Patient presents with     Physical     medicare physical       Initial /68  Pulse 105  Temp 98.4  F (36.9  C) (Oral)  Ht 4' 11\" (1.499 m)  Wt 123 lb (55.8 kg)  SpO2 98%  Breastfeeding? No  BMI 24.84 kg/m2 Estimated body mass index is 24.84 kg/(m^2) as calculated from the following:    Height as of this encounter: 4' 11\" (1.499 m).    Weight as of this encounter: 123 lb (55.8 kg).  Medication Reconciliation: complete    "

## 2017-06-05 NOTE — PROGRESS NOTES
SUBJECTIVE:                                                            Frida Iglesias is a 66 year old female who presents for Preventive Visit.      Are you in the first 12 months of your Medicare Part B coverage?  Yes,  Visual Acuity:  Right Eye: 20/25   Left Eye: 20/25  Both Eyes: 20/25    Healthy Habits:    Do you get at least three servings of calcium containing foods daily (dairy, green leafy vegetables, etc.)? yes    Amount of exercise or daily activities, outside of work: 5-7 day(s) per week    Problems taking medications regularly No    Medication side effects: No    Have you had an eye exam in the past two years? no    Do you see a dentist twice per year? yes    Do you have sleep apnea, excessive snoring or daytime drowsiness?no    COGNITIVE SCREEN  1) Repeat 3 items (Banana, Sunrise, Chair)    2) Clock draw: NORMAL  3) 3 item recall: Recalls 2 objects   Results: NORMAL clock, 1-2 items recalled: COGNITIVE IMPAIRMENT LESS LIKELY    Mini-CogTM Copyright CHARITY Young. Licensed by the author for use in [facility]; reprinted with permission (koki@[facility]). All rights reserved.          Reviewed and updated as needed this visit by clinical staff  Tobacco  Allergies  Meds  Med Hx  Surg Hx  Fam Hx  Soc Hx        Reviewed and updated as needed this visit by Provider  Tobacco  Allergies  Meds  Med Hx  Surg Hx  Fam Hx  Soc Hx       Social History   Substance Use Topics     Smoking status: Former Smoker     Packs/day: 1.50     Years: 10.00     Types: Cigarettes     Quit date: 8/5/1991     Smokeless tobacco: Never Used     Alcohol use No       The patient does not drink >3 drinks per day nor >7 drinks per week.    Today's PHQ-2 Score:   PHQ-2 ( 1999 Pfizer) 6/5/2017   Q1: Little interest or pleasure in doing things 0   Q2: Feeling down, depressed or hopeless 0   PHQ-2 Score 0       Do you feel safe in your environment - Yes    Do you have a Health Care Directive?: No: Advance care planning was  reviewed with patient; patient declined at this time.    Current providers sharing in care for this patient include:   Patient Care Team:  Sobeida Iraheta PA-C as PCP - General (Physician Assistant - Medical)      Hearing impairment: No    Ability to successfully perform activities of daily living: Yes, no assistance needed     Fall risk:  Fallen 2 or more times in the past year?: No  Any fall with injury in the past year?: No    Home safety:  none identified      The following health maintenance items are reviewed in Epic and correct as of today:  Health Maintenance   Topic Date Due     COLONOSCOPY Q5 YR  07/26/1960     DEXA SCAN SCREENING (SYSTEM ASSIGNED)  07/26/2015     INFLUENZA VACCINE (SYSTEM ASSIGNED)  09/01/2017     PNEUMOCOCCAL (2 of 2 - PPSV23) 09/12/2017     FALL RISK ASSESSMENT  06/05/2018     MAMMO SCREEN Q2 YR (SYSTEM ASSIGNED)  06/13/2019     LIPID SCREEN Q5 YR FEMALE (SYSTEM ASSIGNED)  06/05/2022     ADVANCE DIRECTIVE PLANNING Q5 YRS  06/05/2022     TETANUS IMMUNIZATION (SYSTEM ASSIGNED)  09/16/2023     HEPATITIS C SCREENING  Completed         Pneumonia Vaccine: Adults age 65+ who have not received previous Pneumovax (PPSV23) or PCV13 as an adult: Should first be given PCV13 AND then should be given PPSV23 6-12 months after PCV13.    Mammogram Screening: Patient over age 50, mutual decision to screen reflected in health maintenance.  History of abnormal Pap smear: NO - age 65 - see link Cervical Cytology Screening Guidelines     ROS:  C: NEGATIVE for fever, chills, change in weight  I: NEGATIVE for worrisome rashes, moles or lesions  E: NEGATIVE for vision changes or irritation  E/M: NEGATIVE for ear, mouth and throat problems  R: NEGATIVE for significant cough or SOB  B: + for hx of R breast cyst - pt reports she's had this 44 yrs as noted it when she had her son. Last mammogram noted this in 2013 and this was benign. No concerns regarding this has hasn't changed. She cannot palpate it  "on exam either today. Will complete routine screening. NEGATIVE for any breast symptoms  CV: NEGATIVE for chest pain, palpitations or peripheral edema  GI: NEGATIVE for nausea, abdominal pain, heartburn, or change in bowel habits  : + for vaginal irritation and wonders if she has a yeast infection. Occasionally will wipe vaginal region externally and have some whitish discharge. No persistent discharge. Last reated for this was in 2013 when she had a pelvic exam. Seems reminiscent of that, but no real itching this time. Has been dealing with this off/on since then.   NEGATIVE for frequency, dysuria, or hematuria  M: NEGATIVE for significant arthralgias or myalgia  N: NEGATIVE for weakness, dizziness or paresthesias  E: NEGATIVE for temperature intolerance, skin/hair changes  H: NEGATIVE for bleeding problems  P: NEGATIVE for changes in mood or affect    Problem list, Medication list, Allergies, and Medical/Social/Surgical histories reviewed in [facility] and updated as appropriate.  OBJECTIVE:                                                            /74  Pulse 97  Temp 98.4  F (36.9  C) (Oral)  Ht 4' 11\" (1.499 m)  Wt 123 lb (55.8 kg)  SpO2 98%  Breastfeeding? No  BMI 24.84 kg/m2 Estimated body mass index is 24.84 kg/(m^2) as calculated from the following:    Height as of this encounter: 4' 11\" (1.499 m).    Weight as of this encounter: 123 lb (55.8 kg).  EXAM:   GENERAL: healthy, alert and no distress  EYES: Eyes grossly normal to inspection, PERRL and conjunctivae and sclerae normal  HENT: ear canals and TM's normal, nose and mouth without ulcers or lesions  NECK: no adenopathy, no asymmetry, masses, or scars and thyroid normal to palpation  RESP: lungs clear to auscultation - no rales, rhonchi or wheezes  BREAST: normal without masses, tenderness or nipple discharge and no palpable axillary masses or adenopathy  CV: regular rate and rhythm, normal S1 S2, no S3 or S4, no murmur, click or rub, no " peripheral edema and peripheral pulses strong  ABDOMEN: soft, nontender, no hepatosplenomegaly, no masses and bowel sounds normal   (female): normal female external genitalia, normal urethral meatus, vaginal mucosa pink, moist, well rugated, and normal cervix/adnexa/uterus without masses or discharge. She is noted to have a little smegma in between labial folds and suspect this is the whitish discharge she has off/on as no other significant discharge seen. No evidence for yeast on exam.  MS: no gross musculoskeletal defects noted, no edema  SKIN: no suspicious lesions or rashes  NEURO: Normal strength and tone, mentation intact and speech normal  PSYCH: mentation appears normal, affect normal/bright    Results for orders placed or performed in visit on 06/05/17   Comprehensive metabolic panel (BMP + Alb, Alk Phos, ALT, AST, Total. Bili, TP)   Result Value Ref Range    Sodium 141 133 - 144 mmol/L    Potassium 3.6 3.4 - 5.3 mmol/L    Chloride 103 94 - 109 mmol/L    Carbon Dioxide 26 20 - 32 mmol/L    Anion Gap 12 3 - 14 mmol/L    Glucose 88 70 - 99 mg/dL    Urea Nitrogen 13 7 - 30 mg/dL    Creatinine 0.57 0.52 - 1.04 mg/dL    GFR Estimate >90  Non  GFR Calc   >60 mL/min/1.7m2    GFR Estimate If Black >90   GFR Calc   >60 mL/min/1.7m2    Calcium 9.0 8.5 - 10.1 mg/dL    Bilirubin Total 0.5 0.2 - 1.3 mg/dL    Albumin 4.0 3.4 - 5.0 g/dL    Protein Total 8.2 6.8 - 8.8 g/dL    Alkaline Phosphatase 100 40 - 150 U/L    ALT 20 0 - 50 U/L    AST 14 0 - 45 U/L   Lipid Profile with reflex to direct LDL   Result Value Ref Range    Cholesterol 252 (H) <200 mg/dL    Triglycerides 126 <150 mg/dL    HDL Cholesterol 55 >49 mg/dL    LDL Cholesterol Calculated 172 (H) <100 mg/dL    Non HDL Cholesterol 197 (H) <130 mg/dL   Hepatitis C Screen Reflex to HCV RNA Quant and Genotype   Result Value Ref Range    Hepatitis C Antibody  NR     Nonreactive   Assay performance characteristics have not been  established for newborns,   infants, and children     Wet prep   Result Value Ref Range    Specimen Description Vagina     Wet Prep       No Trichomonas seen  No clue cells seen  No yeast seen      Micro Report Status FINAL 06/05/2017        ASSESSMENT / PLAN:                                                                ICD-10-CM    1. Encounter for wellness examination Z00.00    2. Advanced directives, counseling/discussion Z71.89    3. CARDIOVASCULAR SCREENING; LDL GOAL LESS THAN 160 Z13.6 Comprehensive metabolic panel (BMP + Alb, Alk Phos, ALT, AST, Total. Bili, TP)     Lipid Profile with reflex to direct LDL   4. Need for hepatitis C screening test Z11.59 Hepatitis C Screen Reflex to HCV RNA Quant and Genotype   5. Screening for diabetes mellitus Z13.1 Comprehensive metabolic panel (BMP + Alb, Alk Phos, ALT, AST, Total. Bili, TP)     Lipid Profile with reflex to direct LDL   6. Osteopenia M85.80 DX Hip/Pelvis/Spine   7. Post-menopause Z78.0 DX Hip/Pelvis/Spine   8. Personal history of colonic polyps Z86.010     due 2018 for repeat    9. Encounter for screening mammogram for breast cancer Z12.31 MA Screen Bilateral w/Harsha     CANCELED: *MA Screening Digital Bilateral   10. Vaginal irritation N89.8 Wet prep   11. Elevated blood pressure reading without diagnosis of hypertension R03.0    See notes regarding BP below.  Wet prep completed given concerns about yeast, but suspect she is noting normal smegma on exam given otherwise normal and wet prep neg. Provided reassurance.  Will obtain labs and notify of results when available.    End of Life Planning:  Patient currently has an advanced directive: No.  I have verified the patient's ablity to prepare an advanced directive/make health care decisions.  Literature was provided to assist patient in preparing an advanced directive.    COUNSELING:  Reviewed preventive health counseling, as reflected in patient instructions       Regular exercise       Healthy  "diet/nutrition       Osteoporosis Prevention/Bone Health       Colon cancer screening       Hepatitis C screening    BP Screening:   Last 3 BP Readings:    BP Readings from Last 3 Encounters:   06/21/17 146/68   06/14/17 138/70   06/05/17 150/74       The following was recommended to the patient:  Anti-hypertensive phramacology therapy to be considered after RN BP follow-up.  Pt will RTC for RN appt to re-check once per week for next 3 weeks.  If persistently high she is to RTC for BP med visit.      Estimated body mass index is 24.84 kg/(m^2) as calculated from the following:    Height as of this encounter: 4' 11\" (1.499 m).    Weight as of this encounter: 123 lb (55.8 kg).     reports that she quit smoking about 25 years ago. Her smoking use included Cigarettes. She has a 15.00 pack-year smoking history. She has never used smokeless tobacco.      Appropriate preventive services were discussed with this patient, including applicable screening as appropriate for cardiovascular disease, diabetes, osteopenia/osteoporosis, and glaucoma.  As appropriate for age/gender, discussed screening for colorectal cancer, prostate cancer, breast cancer, and cervical cancer. Checklist reviewing preventive services available has been given to the patient.    Reviewed patients plan of care and provided an AVS. The Basic Care Plan (routine screening as documented in Health Maintenance) for Frida meets the Care Plan requirement. This Care Plan has been established and reviewed with the Patient.    Counseling Resources:  ATP IV Guidelines  Pooled Cohorts Equation Calculator  Breast Cancer Risk Calculator  FRAX Risk Assessment  ICSI Preventive Guidelines  Dietary Guidelines for Americans, 2010  USDA's MyPlate  ASA Prophylaxis  Lung CA Screening    Sobeida Iraheta PA-C  [facility]  "

## 2017-06-06 LAB — HCV AB SERPL QL IA: NORMAL

## 2017-06-09 NOTE — PROGRESS NOTES
Please call or write patient with the following results:    -Liver and gallbladder tests are normal. (ALT,AST, Alk phos, bilirubin), kidney function is normal (Cr, GFR), Sodium is normal, Potassium is normal, Calcium is normal, Glucose is normal (diabetes screening test).   -LDL(bad) cholesterol level is elevated,  A diet high in fat and simple carbohydrates, genetics and being overweight can contribute to this. ADVISE: Exercise, a low fat, low carbohydrate diet and weight control are helpful to improve this.  Rechecking your fasting cholesterol panel in 6 months is recommended (Lipid w/ LDL reflex, DX:hyperlipidemia)  -Hepatitis C antibody screen test shows no signs of a previous hepatitis C infection.  -Wet prep was normal as reviewed in clinic - no evidence for yeast infection.    Electronically Signed By: Sobeida Iraheta PA-C

## 2017-06-13 ENCOUNTER — HOSPITAL ENCOUNTER (OUTPATIENT)
Dept: MAMMOGRAPHY | Facility: CLINIC | Age: 67
Discharge: HOME | End: 2017-06-13
Attending: PHYSICIAN ASSISTANT | Admitting: PHYSICIAN ASSISTANT
Payer: COMMERCIAL

## 2017-06-13 DIAGNOSIS — Z12.31 ENCOUNTER FOR SCREENING MAMMOGRAM FOR BREAST CANCER: ICD-10-CM

## 2017-06-13 PROCEDURE — 77063 BREAST TOMOSYNTHESIS BI: CPT

## 2017-06-14 ENCOUNTER — ALLIED HEALTH/NURSE VISIT (OUTPATIENT)
Dept: NURSING | Facility: CLINIC | Age: 67
End: 2017-06-14
Payer: COMMERCIAL

## 2017-06-14 VITALS — DIASTOLIC BLOOD PRESSURE: 70 MMHG | SYSTOLIC BLOOD PRESSURE: 138 MMHG | OXYGEN SATURATION: 99 % | HEART RATE: 84 BPM

## 2017-06-14 DIAGNOSIS — Z01.30 BP CHECK: Primary | ICD-10-CM

## 2017-06-14 DIAGNOSIS — E78.5 HYPERLIPIDEMIA LDL GOAL <130: ICD-10-CM

## 2017-06-14 PROCEDURE — 99207 ZZC NO CHARGE NURSE ONLY: CPT

## 2017-06-14 NOTE — MR AVS SNAPSHOT
After Visit Summary   6/14/2017    Frida Iglesias    MRN: [id number]           Patient Information     Date Of Birth          1950        Visit Information        Provider Department      6/14/2017 10:00 AM [facility]        Today's Diagnoses     BP check    -  1    Hyperlipidemia LDL goal <130           Follow-ups after your visit        Follow-up notes from your care team     Return in about 1 week (around 6/21/2017).      Your next 10 appointments already scheduled     Jun 21, 2017 10:00 AM CDT   Nurse Only with [facility]ULATION CLINIC   [facility]    [address] [address]   [phone]            Jun 29, 2017 10:00 AM CDT   Nurse Only with [facility] ANTICOAGULATION CLINIC   [facility] ([facility]    [address] [address]   [phone]            Jul 05, 2017  9:30 AM CDT   DX HIP/PELVIS/SPINE with RIDX1   [facility] ([facility])    [address]  [address]  [facility] [address]              Please do not take any of the following 24 hours prior to the day of your exam: vitamins, calcium tablets, antacids.              Future tests that were ordered for you today     Open Future Orders        Priority Expected Expires Ordered    **Lipid panel reflex to direct LDL FUTURE 1yr Routine 12/14/2017 1/14/2018 6/14/2017            Who to contact     If you have questions or need follow up information about today's clinic visit or your schedule please contact [facility] directly at [phone].  Normal or non-critical lab and imaging results will be communicated to you by MyChart, letter or phone within 4 business days after the clinic has received the results. If you do not hear from us within 7 days, please contact the clinic through MyChart or phone. If you have a critical or abnormal lab result, we  "will notify you by phone as soon as possible.  Submit refill requests through Wedia or call your pharmacy and they will forward the refill request to us. Please allow 3 business days for your refill to be completed.          Additional Information About Your Visit        Kioskedhart Information     Wedia lets you send messages to your doctor, view your test results, renew your prescriptions, schedule appointments and more. To sign up, go to [email or web address] . Click on \"Log in\" on the left side of the screen, which will take you to the Welcome page. Then click on \"Sign up Now\" on the right side of the page.     You will be asked to enter the access code listed below, as well as some personal information. Please follow the directions to create your username and password.     Your access code is: [email or web address]  Expires: 9/3/2017 12:00 PM     Your access code will  in 90 days. If you need help or a new code, please call your [place] clinic or [phone].        Care EveryWhere ID     This is your Care EveryWhere ID. This could be used by other organizations to access your [place] medical records  [id number]        Your Vitals Were     Pulse Pulse Oximetry                84 99%           Blood Pressure from Last 3 Encounters:   17 138/70   17 150/74   17 106/63    Weight from Last 3 Encounters:   17 123 lb (55.8 kg)   17 109 lb 12.6 oz (49.8 kg)   17 109 lb 12.6 oz (49.8 kg)               Primary Care Provider Office Phone # Fax #    Sobeida Iraheta PA-C [phone]       [facility] [address] [address]        Thank you!     Thank you for choosing [facility]  for your care. Our goal is always to provide you with excellent care. Hearing back from our patients is one way we can continue to improve our services. Please take a few minutes to complete the written survey that you may receive in the mail after " your visit with us. Thank you!             Your Updated Medication List - Protect others around you: Learn how to safely use, store and throw away your medicines at www.disposemymeds.org.      Notice  As of 6/14/2017  1:08 PM    You have not been prescribed any medications.

## 2017-06-14 NOTE — NURSING NOTE
Frida is here today after seen Sobeida Iraheta PA-C on 6-5-2017 , notes from that encounter: The following was recommended to the patient:  Anti-hypertensive pharmacology therapy to be considered after RN BP follow-up.  Pt will RTC for RN appt to re-check once per week for next 3 weeks.  If persistently high she is to RTC for BP med visit.     Frida is not currently taking any medications for blood pressure.   Patient has appointment for next week and the following today Vital 138/70, pulse 84.    Frida did ask if she needs appointment with 6 mo fasting labs. Informed at this time it does not appear so, she will need lab appointment.  only. Informed she may need another OV for blood pressure management as per above.   No orders in at this time for future labs. Pended will send to Sobeida Iraheta PA-C for review.     Firda Iglesias is being followed for Blood Pressure management.    NURSING ASSESSMENT/PLAN:     Current blood pressure medication(s):  None  SUBJECTIVE:                                                      Patient is not monitoring Blood Pressure at home.     OBJECTIVE:                                                    Is pulse 55 or greater? - Yes  Pulse Readings from Last 1 Encounters:   06/14/17 84     Today's BP completed using cuff size: regular on right side arm.  BP Readings from Last 3 Encounters:   06/14/17 138/70   06/05/17 150/74   01/21/17 106/63       No current outpatient prescriptions on file.     Potassium   Date Value Ref Range Status   06/05/2017 3.6 3.4 - 5.3 mmol/L Final     Creatinine   Date Value Ref Range Status   06/05/2017 0.57 0.52 - 1.04 mg/dL Final     Urea Nitrogen   Date Value Ref Range Status   06/05/2017 13 7 - 30 mg/dL Final     GFR Estimate   Date Value Ref Range Status   06/05/2017 >90  Non  GFR Calc   >60 mL/min/1.7m2 Final   Education:  general discussion/verbal explanation    These interventions were used: Empathy/Understanding, Permission to  raise concern or advise and Open ended questions  Education material provided and patient was given an opportunity to ask questions.    Patient verbalized understanding of this plan and is agreeable.    Professional Reference click here     Kiki Monzon R.N.

## 2017-06-14 NOTE — LETTER
[facility]  [address] [facility] [facility] [address] [address]  [phone]        January 22, 2018    Frida Iglesias  [address] [address]              Dear Frida Iglesias    This is to remind you that your fasting lab work is due.    You may call our office at [phone] to schedule an appointment.    Please disregard this notice if you have already had your labs drawn or made an appointment.        Sincerely,    Sobeida Iraheta PA-C

## 2017-06-14 NOTE — Clinical Note
BP done. I pended labs for lipids if you want so they are in place at 6 mos. Please delete if you do not want at this time. Thanks, Kiki

## 2017-06-21 ENCOUNTER — ALLIED HEALTH/NURSE VISIT (OUTPATIENT)
Dept: NURSING | Facility: CLINIC | Age: 67
End: 2017-06-21
Payer: COMMERCIAL

## 2017-06-21 VITALS — DIASTOLIC BLOOD PRESSURE: 68 MMHG | HEART RATE: 80 BPM | SYSTOLIC BLOOD PRESSURE: 146 MMHG

## 2017-06-21 DIAGNOSIS — Z01.30 BP CHECK: Primary | ICD-10-CM

## 2017-06-21 PROCEDURE — 99207 ZZC NO CHARGE LOS: CPT

## 2017-06-21 NOTE — NURSING NOTE
Frida Iglesias is being followed for Blood Pressure management/readings.    NURSING ASSESSMENT/PLAN:    Current blood pressure medication(s):  None, monitoring for 3 weeks. This is visit 2 of 3  1.  The patient is not currently on blood pressure medications  2.  We will not be checking a metabolic lab panel today.      3.  Follow up instructions include:     Monitor again next week.         OBJECTIVE:                                                    Is pulse 55 or greater? - Yes  Pulse Readings from Last 1 Encounters:   06/14/17 84     Today's BP completed using cuff size: regular on right side arm.  BP Readings from Last 3 Encounters:   06/21/17 150/66   06/14/17 138/70   06/05/17 150/74       No current outpatient prescriptions on file.     Potassium   Date Value Ref Range Status   06/05/2017 3.6 3.4 - 5.3 mmol/L Final     Creatinine   Date Value Ref Range Status   06/05/2017 0.57 0.52 - 1.04 mg/dL Final     Urea Nitrogen   Date Value Ref Range Status   06/05/2017 13 7 - 30 mg/dL Final     GFR Estimate   Date Value Ref Range Status   06/05/2017 >90  Non  GFR Calc   >60 mL/min/1.7m2 Final         Education:    Discussed monitoring of blood pressure.   These interventions were used: Reflection- simple or complex, Empathy/Understanding, Permission to raise concern or advise and Roll with resistance  Education material provided and patient was given an opportunity to ask questions.    Patient verbalized understanding of this plan and is agreeable.    Professional Reference click here   Patient will return to clinic in one week for 3rd of 3 readings for blood pressure. Currently not on any antihypertensive medications.   Kiki Monzon RN

## 2017-06-21 NOTE — MR AVS SNAPSHOT
After Visit Summary   6/21/2017    Frida Iglesias    MRN: [id number]           Patient Information     Date Of Birth          1950        Visit Information        Provider Department      6/21/2017 10:00 AM SV ANTICOAGULATION CLINIC [facility]        Today's Diagnoses     BP check    -  1       Follow-ups after your visit        Follow-up notes from your care team     Return in about 1 week (around 6/28/2017).      Your next 10 appointments already scheduled     Jun 29, 2017 10:00 AM CDT   Nurse Only with SV ANTICOAGULATION CLINIC   [facility] ([facility])    [address]  [facility] [address] [phone]            Jul 05, 2017  9:30 AM CDT   DX HIP/PELVIS/SPINE with RIDX1   [facility]    [address]  [address]  [facility] [address]              Please do not take any of the following 24 hours prior to the day of your exam: vitamins, calcium tablets, antacids.              Who to contact     If you have questions or need follow up information about today's clinic visit or your schedule please contact [facility] directly at [phone].  Normal or non-critical lab and imaging results will be communicated to you by MyChart, letter or phone within 4 business days after the clinic has received the results. If you do not hear from us within 7 days, please contact the clinic through [employer] or phone. If you have a critical or abnormal lab result, we will notify you by phone as soon as possible.  Submit refill requests through [employer] or call your pharmacy and they will forward the refill request to us. Please allow 3 business days for your refill to be completed.          Additional Information About Your Visit        MyChart Information     [employer] lets you send messages to your doctor, view your test results, renew your prescriptions, schedule appointments and more. To sign  "up, go to [email or web address] . Click on \"Log in\" on the left side of the screen, which will take you to the Welcome page. Then click on \"Sign up Now\" on the right side of the page.     You will be asked to enter the access code listed below, as well as some personal information. Please follow the directions to create your username and password.     Your access code is: [email or web address]  Expires: 9/3/2017 12:00 PM     Your access code will  in 90 days. If you need help or a new code, please call your [place] clinic or [phone].        Care EveryWhere ID     This is your Care EveryWhere ID. This could be used by other organizations to access your [place] medical records  [id number]        Your Vitals Were     Pulse                   80            Blood Pressure from Last 3 Encounters:   17 146/68   17 138/70   17 150/74    Weight from Last 3 Encounters:   17 123 lb (55.8 kg)   17 109 lb 12.6 oz (49.8 kg)   17 109 lb 12.6 oz (49.8 kg)              Today, you had the following     No orders found for display       Primary Care Provider Office Phone # Fax #    Sobeida Iraheta PA-C [phone]       [facility] [address] [facility] [address]        Equal Access to Services     FAIZA GILBERT : Hadii aad ku hadasho Soomaali, waaxda luqadaha, qaybta kaalmada adeegyada, claudia fox . So [facility] [phone].    ATENCIÓN: Si habla español, tiene a oviedo disposición servicios gratuitos de asistencia lingüística. Honey al [phone].    We comply with applicable federal civil rights laws and [place] laws. We do not discriminate on the basis of race, color, national origin, age, disability sex, sexual orientation or gender identity.            Thank you!     Thank you for choosing [facility]  for your care. Our goal is always to provide you with excellent care. Hearing back from our patients is one " way we can continue to improve our services. Please take a few minutes to complete the written survey that you may receive in the mail after your visit with us. Thank you!             Your Updated Medication List - Protect others around you: Learn how to safely use, store and throw away your medicines at www.disposemymeds.org.      Notice  As of 6/21/2017 10:19 AM    You have not been prescribed any medications.

## 2017-06-29 ENCOUNTER — ALLIED HEALTH/NURSE VISIT (OUTPATIENT)
Dept: NURSING | Facility: CLINIC | Age: 67
End: 2017-06-29
Payer: COMMERCIAL

## 2017-06-29 VITALS
HEART RATE: 102 BPM | DIASTOLIC BLOOD PRESSURE: 80 MMHG | WEIGHT: 122.5 LBS | SYSTOLIC BLOOD PRESSURE: 140 MMHG | BODY MASS INDEX: 24.74 KG/M2 | OXYGEN SATURATION: 98 %

## 2017-06-29 DIAGNOSIS — Z01.30 BLOOD PRESSURE CHECK: Primary | ICD-10-CM

## 2017-06-29 PROCEDURE — 99207 ZZC NO CHARGE NURSE ONLY: CPT

## 2017-06-29 NOTE — PROGRESS NOTES
Hypertension Follow-up      Outpatient blood pressures are not being checked.    Low Salt Diet: no added salt      Amount of exercise or physical activity: 2-3 days/week for an average of 15-30 minutes    Problems taking medications regularly: No    Medication side effects: none    Diet: regular (no restrictions) and low salt    Denies: CP, SOB, Headaches, blurred vision, nausea, vomiting , numbness or tingling        Sondra Chisholm RN, BSN  [facility]

## 2017-06-29 NOTE — MR AVS SNAPSHOT
"              After Visit Summary   6/29/2017    Frida Iglesias    MRN: [id number]           Patient Information     Date Of Birth          1950        Visit Information        Provider Department      6/29/2017 10:00 AM  ANTICOAGULATION CLINIC [facility]        Today's Diagnoses     Blood pressure check    -  1       Follow-ups after your visit        Your next 10 appointments already scheduled     Jul 05, 2017  9:30 AM CDT   DX HIP/PELVIS/SPINE with RIDX1   [facility]    [address]  [address]  [facility] [address]              Please do not take any of the following 24 hours prior to the day of your exam: vitamins, calcium tablets, antacids.            Jul 13, 2017  9:30 AM CDT   Nurse Only with  ANTICOAGULATION CLINIC   [facility] [place] ([facility])    [address]  [facility] [address] [phone]              Who to contact     If you have questions or need follow up information about today's clinic visit or your schedule please contact [facility] directly at [phone].  Normal or non-critical lab and imaging results will be communicated to you by [employer], letter or phone within 4 business days after the clinic has received the results. If you do not hear from us within 7 days, please contact the clinic through [employer] or phone. If you have a critical or abnormal lab result, we will notify you by phone as soon as possible.  Submit refill requests through [employer] or call your pharmacy and they will forward the refill request to us. Please allow 3 business days for your refill to be completed.          Additional Information About Your Visit        MyChart Information     [employer] lets you send messages to your doctor, view your test results, renew your prescriptions, schedule appointments and more. To sign up, go to [email or web address] . Click on \"Log in\" on the left side of " "the screen, which will take you to the Welcome page. Then click on \"Sign up Now\" on the right side of the page.     You will be asked to enter the access code listed below, as well as some personal information. Please follow the directions to create your username and password.     Your access code is: [email or web address]  Expires: 9/3/2017 12:00 PM     Your access code will  in 90 days. If you need help or a new code, please call your [place] clinic or [phone].        Care EveryWhere ID     This is your Care EveryWhere ID. This could be used by other organizations to access your [place] medical records  [id number]        Your Vitals Were     Pulse Pulse Oximetry BMI (Body Mass Index)             102 98% 24.74 kg/m2          Blood Pressure from Last 3 Encounters:   17 140/80   17 146/68   17 138/70    Weight from Last 3 Encounters:   17 122 lb 8 oz (55.6 kg)   17 123 lb (55.8 kg)   17 109 lb 12.6 oz (49.8 kg)              Today, you had the following     No orders found for display       Primary Care Provider Office Phone # Fax #    Sobeida Iraheta PA-C [phone]       [facility] [address] [facility] [address]        Equal Access to Services     [facility] Hospital[facility] : Hadii aad ku hadasho Soomaali, waaxda luqadaha, qaybta kaalmada adeegyada, waxay sunny hayyandy fox . So [facility] [phone].    ATENCIÓN: Si habla español, tiene a oviedo disposición servicios gratuitos de asistencia lingüística. Llame al [phone].    We comply with applicable federal civil rights laws and [place] laws. We do not discriminate on the basis of race, color, national origin, age, disability sex, sexual orientation or gender identity.            Thank you!     Thank you for choosing [facility]  for your care. Our goal is always to provide you with excellent care. Hearing back from our patients is one way we can continue to improve our " services. Please take a few minutes to complete the written survey that you may receive in the mail after your visit with us. Thank you!             Your Updated Medication List - Protect others around you: Learn how to safely use, store and throw away your medicines at www.disposemymeds.org.      Notice  As of 6/29/2017 10:09 AM    You have not been prescribed any medications.

## 2017-07-05 ENCOUNTER — RADIANT APPOINTMENT (OUTPATIENT)
Dept: BONE DENSITY | Facility: CLINIC | Age: 67
End: 2017-07-05
Attending: PHYSICIAN ASSISTANT
Payer: COMMERCIAL

## 2017-07-05 DIAGNOSIS — Z78.0 POST-MENOPAUSE: ICD-10-CM

## 2017-07-05 DIAGNOSIS — M85.80 OSTEOPENIA: ICD-10-CM

## 2017-07-05 PROCEDURE — 77080 DXA BONE DENSITY AXIAL: CPT | Performed by: INTERNAL MEDICINE

## 2017-07-12 ENCOUNTER — OFFICE VISIT (OUTPATIENT)
Dept: FAMILY MEDICINE | Facility: CLINIC | Age: 67
End: 2017-07-12
Payer: COMMERCIAL

## 2017-07-12 VITALS
OXYGEN SATURATION: 100 % | BODY MASS INDEX: 24.19 KG/M2 | TEMPERATURE: 97.4 F | HEART RATE: 102 BPM | WEIGHT: 120 LBS | DIASTOLIC BLOOD PRESSURE: 80 MMHG | HEIGHT: 59 IN | SYSTOLIC BLOOD PRESSURE: 140 MMHG

## 2017-07-12 DIAGNOSIS — I10 HYPERTENSION GOAL BP (BLOOD PRESSURE) < 140/90: Primary | ICD-10-CM

## 2017-07-12 PROCEDURE — 99213 OFFICE O/P EST LOW 20 MIN: CPT | Performed by: PHYSICIAN ASSISTANT

## 2017-07-12 PROCEDURE — 82043 UR ALBUMIN QUANTITATIVE: CPT | Performed by: PHYSICIAN ASSISTANT

## 2017-07-12 RX ORDER — LISINOPRIL 10 MG/1
10 TABLET ORAL DAILY
Qty: 30 TABLET | Refills: 1 | Status: SHIPPED | OUTPATIENT
Start: 2017-07-12 | End: 2017-08-09

## 2017-07-12 NOTE — PROGRESS NOTES
SUBJECTIVE:                                                    Frida Iglesias is a 66 year old female who presents to clinic today for the following health issues:      Hypertension Follow-up; just had picked up a home cuff. Tries to sit for a bit before she has it checked. Brachial automated cuff.       Outpatient blood pressures are being checked at home.  Results are 131/77, 142/78, 137/77    Low Salt Diet: no added salt      Amount of exercise or physical activity: 6-7 days/week for an average of 15-30 minutes    Problems taking medications regularly: No    Medication side effects: none    Diet: regular (no restrictions) - was eating more prepared dinners and has tried to change this due to salt intake. Doesn't add salt.       Problem list and histories reviewed & adjusted, as indicated.  Additional history: as documented    Patient Active Problem List   Diagnosis     Advanced directives, counseling/discussion     CARDIOVASCULAR SCREENING; LDL GOAL LESS THAN 160     Personal history of colonic polyps     Osteopenia     Post-menopause     Hypertension goal BP (blood pressure) < 140/90     History reviewed. No pertinent surgical history.    Social History   Substance Use Topics     Smoking status: Former Smoker     Packs/day: 1.50     Years: 10.00     Types: Cigarettes     Quit date: 8/5/1991     Smokeless tobacco: Never Used     Alcohol use No     History reviewed. No pertinent family history.      Current Outpatient Prescriptions   Medication Sig Dispense Refill     lisinopril (PRINIVIL/ZESTRIL) 10 MG tablet Take 1 tablet (10 mg) by mouth daily 30 tablet 1     No Known Allergies    Reviewed and updated as needed this visit by clinical staff  Tobacco  Allergies  Meds  Med Hx  Surg Hx  Fam Hx  Soc Hx      Reviewed and updated as needed this visit by Provider  Tobacco  Allergies  Meds  Med Hx  Surg Hx  Fam Hx  Soc Hx          ROS:  Constitutional, HEENT, cardiovascular, pulmonary, gi and gu systems  "are negative, except as otherwise noted.    OBJECTIVE:     /80  Pulse 102  Temp 97.4  F (36.3  C) (Tympanic)  Ht 4' 11\" (1.499 m)  Wt 120 lb (54.4 kg)  SpO2 100%  Breastfeeding? No  BMI 24.24 kg/m2  Body mass index is 24.24 kg/(m^2).  GENERAL: healthy, alert and no distress  RESP: lungs clear to auscultation - no rales, rhonchi or wheezes  CV: regular rate and rhythm, normal S1 S2, no S3 or S4, no murmur, click or rub, no peripheral edema and peripheral pulses strong    Diagnostic Test Results:  none     ASSESSMENT/PLAN:       ICD-10-CM    1. Hypertension goal BP (blood pressure) < 140/90 I10 lisinopril (PRINIVIL/ZESTRIL) 10 MG tablet     Albumin Random Urine Quantitative   Encouraged pt to bring home cuff in to have it checked against ours to ensure accurate info.   See Patient Instructions  Pt in agreement with plan.   Patient Instructions   Given persistent BP elevation since last year would advise initiation BP lowering med.  Start with lisinopril.  SE/risks reviewed.  Obtain microalbumin today.  Re-check in 1 month.  Continue checking on home BP cuff in the interim.    Electronically Signed By: Sobeida Iraheta PA-C          "

## 2017-07-12 NOTE — PATIENT INSTRUCTIONS
Given persistent BP elevation since last year would advise initiation BP lowering med.  Start with lisinopril.  SE/risks reviewed.  Obtain microalbumin today.  Re-check in 1 month.  Continue checking on home BP cuff in the interim.    Electronically Signed By: Sobeida Iraheta PA-C

## 2017-07-12 NOTE — LETTER
Frida Iglesias  [address] [address]        July 21, 2017          Dear MsBrockKelsie,    We are writing to inform you of your test results.    Microalbumin (urine protein) test is normal.  ADVISE: recheck annually      Resulted Orders   Albumin Random Urine Quantitative   Result Value Ref Range    Creatinine Urine 62 mg/dL    Albumin Urine mg/L 7 mg/L    Albumin Urine mg/g Cr 10.98 0 - 25 mg/g Cr       Thank you very much for choosing [facility]. Please call my office if you have any questions or concerns.       Sincerely,        Sobeida Iraheta PA-C

## 2017-07-12 NOTE — MR AVS SNAPSHOT
"              After Visit Summary   7/12/2017    Frida Iglesias    MRN: [id number]           Patient Information     Date Of Birth          1950        Visit Information        Provider Department      7/12/2017 10:00 AM Sobeida Iraheta PA-C [facility]        Today's Diagnoses     Hypertension goal BP (blood pressure) < 140/90    -  1    Screen for colon cancer        Asymptomatic postmenopausal status          Care Instructions    Given persistent BP elevation since last year would advise initiation BP lowering med.  Start with lisinopril.  SE/risks reviewed.  Obtain microalbumin today.  Re-check in 1 month.  Continue checking on home BP cuff in the interim.    Electronically Signed By: Sobeida Iraheta PA-C            Follow-ups after your visit        Who to contact     If you have questions or need follow up information about today's clinic visit or your schedule please contact [facility] directly at [phone].  Normal or non-critical lab and imaging results will be communicated to you by MyChart, letter or phone within 4 business days after the clinic has received the results. If you do not hear from us within 7 days, please contact the clinic through "[employer]hart or phone. If you have a critical or abnormal lab result, we will notify you by phone as soon as possible.  Submit refill requests through [employer] or call your pharmacy and they will forward the refill request to us. Please allow 3 business days for your refill to be completed.          Additional Information About Your Visit        "[employer]harB-Obvious Information     [employer] lets you send messages to your doctor, view your test results, renew your prescriptions, schedule appointments and more. To sign up, go to [email or web address] . Click on \"Log in\" on the left side of the screen, which will take you to the Welcome page. Then click on \"Sign up Now\" on the right side of the page.     You will be asked to enter the " "access code listed below, as well as some personal information. Please follow the directions to create your username and password.     Your access code is: [email or web address]  Expires: 9/3/2017 12:00 PM     Your access code will  in 90 days. If you need help or a new code, please call your [place] clinic or [phone].        Care EveryWhere ID     This is your Care EveryWhere ID. This could be used by other organizations to access your [place] medical records  [id number]        Your Vitals Were     Pulse Temperature Height Pulse Oximetry Breastfeeding? BMI (Body Mass Index)    102 97.4  F (36.3  C) (Tympanic) 4' 11\" (1.499 m) 100% No 24.24 kg/m2       Blood Pressure from Last 3 Encounters:   17 140/80   17 140/80   17 146/68    Weight from Last 3 Encounters:   17 120 lb (54.4 kg)   17 122 lb 8 oz (55.6 kg)   17 123 lb (55.8 kg)              We Performed the Following     Albumin Random Urine Quantitative          Today's Medication Changes          These changes are accurate as of: 17 10:23 AM.  If you have any questions, ask your nurse or doctor.               Start taking these medicines.        Dose/Directions    lisinopril 10 MG tablet   Commonly known as:  PRINIVIL/ZESTRIL   Used for:  Hypertension goal BP (blood pressure) < 140/90   Started by:  Sobeida Iraheta PA-C        Dose:  10 mg   Take 1 tablet (10 mg) by mouth daily   Quantity:  30 tablet   Refills:  1            Where to get your medicines      These medications were sent to [id number] - [place], [place] - [address] [facility] [address] [address] [facility]  [address] [address], [place] [address]     Phone:  [phone]     lisinopril 10 MG tablet                Primary Care Provider Office Phone # Fax #    Sobeida Iraheta PA-C [phone]       [facility] [address] [facility] [address]        Equal Access to Services     FAIZA GILBERT AH: jennifer Lema " sherry ricoalbert cmlouisa aleahclaudia vazquez. So [facility] [phone].    ATENCIÓN: Si robert price, tiene a oviedo disposición servicios gratuitos de asistencia lingüística. Llame al [phone].    We comply with applicable federal civil rights laws and [place] laws. We do not discriminate on the basis of race, color, national origin, age, disability sex, sexual orientation or gender identity.            Thank you!     Thank you for choosing [facility]  for your care. Our goal is always to provide you with excellent care. Hearing back from our patients is one way we can continue to improve our services. Please take a few minutes to complete the written survey that you may receive in the mail after your visit with us. Thank you!             Your Updated Medication List - Protect others around you: Learn how to safely use, store and throw away your medicines at www.disposemymeds.org.          This list is accurate as of: 7/12/17 10:23 AM.  Always use your most recent med list.                   Brand Name Dispense Instructions for use Diagnosis    lisinopril 10 MG tablet    PRINIVIL/ZESTRIL    30 tablet    Take 1 tablet (10 mg) by mouth daily    Hypertension goal BP (blood pressure) < 140/90

## 2017-07-12 NOTE — Clinical Note
Please abstract the following data from this visit with this patient into the appropriate field in Epic:  Colonoscopy done on this date: 12/9/2013 (approximately), by this group: [facility], results were Normal excluding polyps - tubular adenoma. Repeat in 5 yrs.

## 2017-07-14 LAB
CREAT UR-MCNC: 62 MG/DL
MICROALBUMIN UR-MCNC: 7 MG/L
MICROALBUMIN/CREAT UR: 10.98 MG/G CR (ref 0–25)

## 2017-07-20 ENCOUNTER — ALLIED HEALTH/NURSE VISIT (OUTPATIENT)
Dept: NURSING | Facility: CLINIC | Age: 67
End: 2017-07-20
Payer: COMMERCIAL

## 2017-07-20 VITALS
HEART RATE: 106 BPM | SYSTOLIC BLOOD PRESSURE: 136 MMHG | HEIGHT: 59 IN | BODY MASS INDEX: 23.83 KG/M2 | OXYGEN SATURATION: 95 % | DIASTOLIC BLOOD PRESSURE: 68 MMHG | WEIGHT: 118.2 LBS

## 2017-07-20 DIAGNOSIS — I10 HYPERTENSION GOAL BP (BLOOD PRESSURE) < 140/90: Primary | ICD-10-CM

## 2017-07-20 PROCEDURE — 99207 ZZC NO CHARGE NURSE ONLY: CPT

## 2017-07-20 NOTE — PROGRESS NOTES
Please call or write patient with the following results:    -Bone Density Scan (DEXA) shows osteopenia (decrease in bone density). Please ensure at least 1200mg of dietary calcium and start 800 units of vitamin D supplementation daily. We can repeat this study in 5 years.    Electronically Signed By: Sobeida Iraheta PA-C

## 2017-07-20 NOTE — PROGRESS NOTES
Hypertension Follow-up      Outpatient blood pressures are being checked at home.  Results are 142-125/70-64.    Low Salt Diet: no added salt        Amount of exercise or physical activity: 6-7 days/week for an average of 15-30 minutes    Problems taking medications regularly: No    Medication side effects: none  Diet: low salt       Denies: CP,SOB, headaches, blurred vision, nausea, vomiting     Sondra Chisholm RN, BSN  [facility]

## 2017-07-20 NOTE — MR AVS SNAPSHOT
"              After Visit Summary   7/20/2017    Frida Iglesias    MRN: [id number]           Patient Information     Date Of Birth          1950        Visit Information        Provider Department      7/20/2017 10:15 AM [facility]        Today's Diagnoses     Hypertension goal BP (blood pressure) < 140/90    -  1       Follow-ups after your visit        Your next 10 appointments already scheduled     Aug 09, 2017 10:00 AM CDT   Office Visit with Sobeida Iraheta PA-C   [facility] ([facility])    [address] [facility] [id number] [phone]           Bring a current list of meds and any records pertaining to this visit.  For Physicals, please bring immunization records and any forms needing to be filled out.  Please arrive 10 minutes early to complete paperwork.              Who to contact     If you have questions or need follow up information about today's clinic visit or your schedule please contact [facility] [place] directly at [phone].  Normal or non-critical lab and imaging results will be communicated to you by [employer], letter or phone within 4 business days after the clinic has received the results. If you do not hear from us within 7 days, please contact the clinic through [employer] or phone. If you have a critical or abnormal lab result, we will notify you by phone as soon as possible.  Submit refill requests through [employer] or call your pharmacy and they will forward the refill request to us. Please allow 3 business days for your refill to be completed.          Additional Information About Your Visit        TensorCommharUdorse Information     [employer] lets you send messages to your doctor, view your test results, renew your prescriptions, schedule appointments and more. To sign up, go to [email or web address] . Click on \"Log in\" on the left side of the screen, which will take you to the Welcome page. Then click on " "\"Sign up Now\" on the right side of the page.     You will be asked to enter the access code listed below, as well as some personal information. Please follow the directions to create your username and password.     Your access code is: [email or web address]  Expires: 9/3/2017 12:00 PM     Your access code will  in 90 days. If you need help or a new code, please call your [place] clinic or [phone].        Care EveryWhere ID     This is your Care EveryWhere ID. This could be used by other organizations to access your [place] medical records  [id number]        Your Vitals Were     Pulse Height Pulse Oximetry BMI (Body Mass Index)          106 4' 11\" (1.499 m) 95% 23.87 kg/m2         Blood Pressure from Last 3 Encounters:   17 136/68   17 140/80   17 140/80    Weight from Last 3 Encounters:   17 118 lb 3.2 oz (53.6 kg)   17 120 lb (54.4 kg)   17 122 lb 8 oz (55.6 kg)              Today, you had the following     No orders found for display       Primary Care Provider Office Phone # Fax #    Sobeida Iraheta PA-C [phone]       [facility] [address] [facility] [address]        Equal Access to Services     [facility]: Hadii aad ku hadasho Soomaali, waaxda luqadaha, qaybta kaalmada adeegyada, claudia correa hayyandy fox . So [facility] [phone].    ATENCIÓN: Si habla español, tiene a oviedo disposición servicios gratuitos de asistencia lingüística. Llame al [phone].    We comply with applicable federal civil rights laws and [place] laws. We do not discriminate on the basis of race, color, national origin, age, disability sex, sexual orientation or gender identity.            Thank you!     Thank you for choosing [facility]  for your care. Our goal is always to provide you with excellent care. Hearing back from our patients is one way we can continue to improve our services. Please take a few minutes to complete " the written survey that you may receive in the mail after your visit with us. Thank you!             Your Updated Medication List - Protect others around you: Learn how to safely use, store and throw away your medicines at www.disposemymeds.org.          This list is accurate as of: 7/20/17 10:15 AM.  Always use your most recent med list.                   Brand Name Dispense Instructions for use Diagnosis    lisinopril 10 MG tablet    PRINIVIL/ZESTRIL    30 tablet    Take 1 tablet (10 mg) by mouth daily    Hypertension goal BP (blood pressure) < 140/90

## 2017-07-20 NOTE — PROGRESS NOTES
Please call or write patient with the following results:    -Microalbumin (urine protein) test is normal.  ADVISE: recheck annually    Electronically Signed By: Sobeida Iraheta PA-C

## 2017-08-09 ENCOUNTER — OFFICE VISIT (OUTPATIENT)
Dept: FAMILY MEDICINE | Facility: CLINIC | Age: 67
End: 2017-08-09
Payer: COMMERCIAL

## 2017-08-09 VITALS
DIASTOLIC BLOOD PRESSURE: 68 MMHG | TEMPERATURE: 98 F | OXYGEN SATURATION: 98 % | HEIGHT: 59 IN | HEART RATE: 88 BPM | BODY MASS INDEX: 23.39 KG/M2 | SYSTOLIC BLOOD PRESSURE: 138 MMHG | WEIGHT: 116 LBS

## 2017-08-09 DIAGNOSIS — E78.5 HYPERLIPIDEMIA LDL GOAL <130: ICD-10-CM

## 2017-08-09 DIAGNOSIS — I10 HYPERTENSION GOAL BP (BLOOD PRESSURE) < 140/90: Primary | ICD-10-CM

## 2017-08-09 DIAGNOSIS — M85.80 OSTEOPENIA, UNSPECIFIED LOCATION: ICD-10-CM

## 2017-08-09 LAB
ANION GAP SERPL CALCULATED.3IONS-SCNC: 6 MMOL/L (ref 3–14)
BUN SERPL-MCNC: 10 MG/DL (ref 7–30)
CALCIUM SERPL-MCNC: 9.9 MG/DL (ref 8.5–10.1)
CHLORIDE SERPL-SCNC: 104 MMOL/L (ref 94–109)
CO2 SERPL-SCNC: 29 MMOL/L (ref 20–32)
CREAT SERPL-MCNC: 0.64 MG/DL (ref 0.52–1.04)
GFR SERPL CREATININE-BSD FRML MDRD: NORMAL ML/MIN/1.7M2
GLUCOSE SERPL-MCNC: 98 MG/DL (ref 70–99)
POTASSIUM SERPL-SCNC: 4.2 MMOL/L (ref 3.4–5.3)
SODIUM SERPL-SCNC: 139 MMOL/L (ref 133–144)

## 2017-08-09 PROCEDURE — 99214 OFFICE O/P EST MOD 30 MIN: CPT | Performed by: PHYSICIAN ASSISTANT

## 2017-08-09 PROCEDURE — 82306 VITAMIN D 25 HYDROXY: CPT | Performed by: PHYSICIAN ASSISTANT

## 2017-08-09 PROCEDURE — 80048 BASIC METABOLIC PNL TOTAL CA: CPT | Performed by: PHYSICIAN ASSISTANT

## 2017-08-09 PROCEDURE — 36415 COLL VENOUS BLD VENIPUNCTURE: CPT | Performed by: PHYSICIAN ASSISTANT

## 2017-08-09 RX ORDER — LISINOPRIL 10 MG/1
10 TABLET ORAL DAILY
Qty: 90 TABLET | Refills: 3 | Status: SHIPPED | OUTPATIENT
Start: 2017-08-09 | End: 2018-06-15

## 2017-08-09 NOTE — MR AVS SNAPSHOT
"              After Visit Summary   8/9/2017    Frida Iglesias    MRN: [id number]           Patient Information     Date Of Birth          1950        Visit Information        Provider Department      8/9/2017 10:00 AM Sobeida Iraheta PA-C [facility] [place]        Today's Diagnoses     Osteopenia, unspecified location    -  1    Hypertension goal BP (blood pressure) < 140/90        Hyperlipidemia LDL goal <130          Care Instructions    So glad BP has improved on current dose.  Continue low salt diet and exercise routine.  Ok to continue current 10mg dose and re-filled for 1 yr assuming BP stays in goal range <140/90.  Re-check BMP for stability since starting med.  Check vitamin D levels today as well.  Re-check cholesterol with lab appt in 6 months then follow-up visit after to discuss.    Electronically Signed By: Sobeida Iraheta PA-C            Follow-ups after your visit        Who to contact     If you have questions or need follow up information about today's clinic visit or your schedule please contact [facility] [place] directly at [phone].  Normal or non-critical lab and imaging results will be communicated to you by "[employer]hart, letter or phone within 4 business days after the clinic has received the results. If you do not hear from us within 7 days, please contact the clinic through [employer] or phone. If you have a critical or abnormal lab result, we will notify you by phone as soon as possible.  Submit refill requests through [employer] or call your pharmacy and they will forward the refill request to us. Please allow 3 business days for your refill to be completed.          Additional Information About Your Visit        MyChart Information     [employer] lets you send messages to your doctor, view your test results, renew your prescriptions, schedule appointments and more. To sign up, go to [email or web address] . Click on \"Log in\" on the left side of the screen, which " "will take you to the Welcome page. Then click on \"Sign up Now\" on the right side of the page.     You will be asked to enter the access code listed below, as well as some personal information. Please follow the directions to create your username and password.     Your access code is: [email or web address]  Expires: 9/3/2017 12:00 PM     Your access code will  in 90 days. If you need help or a new code, please call your [facility] or [phone].        Care EveryWhere ID     This is your Care EveryWhere ID. This could be used by other organizations to access your [place] medical records  [id number]        Your Vitals Were     Pulse Temperature Height Pulse Oximetry Breastfeeding? BMI (Body Mass Index)    88 98  F (36.7  C) (Oral) 4' 11\" (1.499 m) 98% No 23.43 kg/m2       Blood Pressure from Last 3 Encounters:   17 138/68   17 136/68   17 140/80    Weight from Last 3 Encounters:   17 116 lb (52.6 kg)   17 118 lb 3.2 oz (53.6 kg)   17 120 lb (54.4 kg)              We Performed the Following     25 Hydroxyvitamin D2 and D3     Basic metabolic panel  (Ca, Cl, CO2, Creat, Gluc, K, Na, BUN)          Where to get your medicines      These medications were sent [id number] [place] [place] [address] [address]  [id number] [place] [id number]     Phone:  [phone]     lisinopril 10 MG tablet          Primary Care Provider Office Phone # Fax #    Sobeida Iraheta PA-C [phone] [address]  [place] [address]        Equal Access to Services     [facility] OFELIA : Lani Molina, wadanielda luqadaha, qaybta kaalmada norm, claudia disla. So [facility] [phone].    ATENCIÓN: Si habla español, tiene a oviedo disposición servicios gratuitos de asistencia lingüística. Llame al [phone].    We comply with applicable federal civil rights laws and [place] laws. We do not discriminate on the basis of " race, color, national origin, age, disability sex, sexual orientation or gender identity.            Thank you!     Thank you for choosing [facility] [place]  for your care. Our goal is always to provide you with excellent care. Hearing back from our patients is one way we can continue to improve our services. Please take a few minutes to complete the written survey that you may receive in the mail after your visit with us. Thank you!             Your Updated Medication List - Protect others around you: Learn how to safely use, store and throw away your medicines at www.disposemymeds.org.          This list is accurate as of: 8/9/17 10:14 AM.  Always use your most recent med list.                   Brand Name Dispense Instructions for use Diagnosis    lisinopril 10 MG tablet    PRINIVIL/ZESTRIL    90 tablet    Take 1 tablet (10 mg) by mouth daily    Hypertension goal BP (blood pressure) < 140/90       VIACTIV 500-500-40 MG-UNT-MCG Chew   Generic drug:  calcium-vitamin D-vitamin K      Take 2 tablets by mouth daily    Osteopenia, unspecified location

## 2017-08-09 NOTE — LETTER
Frida Iglesias  [address] [address]        August 21, 2017          Dear ,    We are writing to inform you of your test results.    -Kidney function is normal (Cr, GFR), Sodium is normal, Potassium is normal, Calcium is normal, Glucose is normal (diabetes screening test).   -Vitamin D level is normal, continue your current supplementation regimen as reviewed in clinic.     Resulted Orders   Basic metabolic panel  (Ca, Cl, CO2, Creat, Gluc, K, Na, BUN)   Result Value Ref Range    Sodium 139 133 - 144 mmol/L    Potassium 4.2 3.4 - 5.3 mmol/L    Chloride 104 94 - 109 mmol/L    Carbon Dioxide 29 20 - 32 mmol/L    Anion Gap 6 3 - 14 mmol/L    Glucose 98 70 - 99 mg/dL      Comment:      Non Fasting    Urea Nitrogen 10 7 - 30 mg/dL    Creatinine 0.64 0.52 - 1.04 mg/dL    GFR Estimate >90  Non  GFR Calc   >60 mL/min/1.7m2    GFR Estimate If Black >90   GFR Calc   >60 mL/min/1.7m2    Calcium 9.9 8.5 - 10.1 mg/dL   25 Hydroxyvitamin D2 and D3   Result Value Ref Range    25 OH Vit D2 <5 ug/L    25 OH Vit D3 49 ug/L    25 OH Vit D total  20 - 75 ug/L     <54  Season, race, dietary intake, and treatment affect the concentration of   25-hydroxy-Vitamin D. Values may decrease during winter months and increase   during summer months. Values 20-29 ug/L may indicate Vitamin D insufficiency   and values <20 ug/L may indicate Vitamin D deficiency.   This test was developed and its performance characteristics determined by the   [facility],  Special Chemistry Laboratory. It has   not been cleared or approved by the FDA. The laboratory is regulated under CLIA   as qualified to perform high-complexity testing. This test is used for clinical   purposes. It should not be regarded as investigational or for research.         If you have any questions or concerns, please call the clinic at the number listed above.       Sincerely,        Sobeida  Fely Iraheta PA-C

## 2017-08-09 NOTE — PATIENT INSTRUCTIONS
So glad BP has improved on current dose.  Continue low salt diet and exercise routine.  Ok to continue current 10mg dose and re-filled for 1 yr assuming BP stays in goal range <140/90.  Re-check BMP for stability since starting med.  Check vitamin D levels today as well.  Re-check cholesterol with lab appt in 6 months then follow-up visit after to discuss.    Electronically Signed By: Sobeida Iraheta PA-C

## 2017-08-09 NOTE — NURSING NOTE
"Chief Complaint   Patient presents with     Hypertension       Initial /68  Pulse 100  Temp 98  F (36.7  C) (Oral)  Ht 4' 11\" (1.499 m)  Wt 116 lb (52.6 kg)  SpO2 98%  Breastfeeding? No  BMI 23.43 kg/m2 Estimated body mass index is 23.43 kg/(m^2) as calculated from the following:    Height as of this encounter: 4' 11\" (1.499 m).    Weight as of this encounter: 116 lb (52.6 kg).  Medication Reconciliation: complete    "

## 2017-08-09 NOTE — PROGRESS NOTES
SUBJECTIVE:                                                    Frida Iglesias is a 67 year old female who presents to clinic today for the following health issues:      Hypertension Follow-up; started new med lisinopril 1 month ago. Tolerating well without SE.  Starting to watch salt in her diet.  Walking at a more brisk pace.  Brings BP log showing 15 readings with highest reading 142//74 and lowest 123/69.  Last couple of weeks avg was in 120-130's/60-70's.  Did bring home cuff       Outpatient blood pressures are being checked at home.  Results are [id number].    Low Salt Diet: no added salt    Amount of exercise or physical activity: 2-3 days/week for an average of 30-45 minutes    Problems taking medications regularly: No    Medication side effects: none  Diet: low salt    The 10-year ASCVD risk score (Jaz HOLT Jr, et al., 2013) is: 11.7%    Values used to calculate the score:      Age: 67 years      Sex: Female      Is Non- : No      Diabetic: No      Tobacco smoker: No      Systolic Blood Pressure: 138 mmHg      Is BP treated: Yes      HDL Cholesterol: 55 mg/dL      Total Cholesterol: 252 mg/dL    Problem list and histories reviewed & adjusted, as indicated.  Additional history: as documented    Patient Active Problem List   Diagnosis     Advanced directives, counseling/discussion     CARDIOVASCULAR SCREENING; LDL GOAL LESS THAN 160     Personal history of colonic polyps     Osteopenia     Post-menopause     Hypertension goal BP (blood pressure) < 140/90     History reviewed. No pertinent surgical history.    Social History   Substance Use Topics     Smoking status: Former Smoker     Packs/day: 1.50     Years: 10.00     Types: Cigarettes     Quit date: 8/5/1991     Smokeless tobacco: Never Used     Alcohol use No     History reviewed. No pertinent family history.      Current Outpatient Prescriptions   Medication Sig Dispense Refill     lisinopril (PRINIVIL/ZESTRIL) 10 MG  "tablet Take 1 tablet (10 mg) by mouth daily 90 tablet 3     calcium-vitamin D-vitamin K (VIACTIV) 500-500-40 MG-UNT-MCG CHEW Take 2 tablets by mouth daily  0     [DISCONTINUED] lisinopril (PRINIVIL/ZESTRIL) 10 MG tablet Take 1 tablet (10 mg) by mouth daily 30 tablet 1     No Known Allergies      Reviewed and updated as needed this visit by clinical staffTobacco  Allergies  Meds  Med Hx  Surg Hx  Fam Hx  Soc Hx      Reviewed and updated as needed this visit by Provider  Tobacco  Allergies  Meds  Med Hx  Surg Hx  Fam Hx  Soc Hx          ROS:  Constitutional, HEENT, cardiovascular, pulmonary systems are negative, except as otherwise noted.      OBJECTIVE:   /68  Pulse 88  Temp 98  F (36.7  C) (Oral)  Ht 4' 11\" (1.499 m)  Wt 116 lb (52.6 kg)  SpO2 98%  Breastfeeding? No  BMI 23.43 kg/m2  Body mass index is 23.43 kg/(m^2).  GENERAL: healthy, alert and no distress  RESP: lungs clear to auscultation - no rales, rhonchi or wheezes  CV: regular rate and rhythm, normal S1 S2, no S3 or S4, no murmur, click or rub, no peripheral edema and peripheral pulses strong    Diagnostic Test Results:  The 10-year ASCVD risk score ([place] DC Jr, et al., 2013) is: 11.7%    Values used to calculate the score:      Age: 67 years      Sex: Female      Is Non- : No      Diabetic: No      Tobacco smoker: No      Systolic Blood Pressure: 138 mmHg      Is BP treated: Yes      HDL Cholesterol: 55 mg/dL      Total Cholesterol: 252 mg/dL      ASSESSMENT/PLAN:       ICD-10-CM    1. Hypertension goal BP (blood pressure) < 140/90 I10 lisinopril (PRINIVIL/ZESTRIL) 10 MG tablet     Basic metabolic panel  (Ca, Cl, CO2, Creat, Gluc, K, Na, BUN)   2. Osteopenia, unspecified location M85.80 25 Hydroxyvitamin D2 and D3     calcium-vitamin D-vitamin K (VIACTIV) 500-500-40 MG-UNT-MCG CHEW   3. Hyperlipidemia LDL goal <130 E78.5    BP at goal in clinic and even better on home readings.  Given she is making continued " life-style changes we will continue this dose and monitor for now.  Encouraged to f/u sooner with noticing any BP worsening.  Pt had additional questions regarding calcium and vitamin D supplementation given hx of osteopenia so we reviewed this today as well. She will continue current regimen of viactiv chews and check vitamin D levels today.  Did also broach potential need to initiate statin therapy given CV risk assessment.  Pt will continue dietary modifications with re-check in 6 months and amenable to return to discuss starting cholesterol lower med at that time if indicated.  See Patient Instructions  Patient Instructions   So glad BP has improved on current dose.  Continue low salt diet and exercise routine.  Ok to continue current 10mg dose and re-filled for 1 yr assuming BP stays in goal range <140/90.  Re-check BMP for stability since starting med.  Check vitamin D levels today as well.  Re-check cholesterol with lab appt in 6 months then follow-up visit after to discuss.    Electronically Signed By: Sobeida Iraheta PA-C

## 2017-08-14 LAB
DEPRECATED CALCIDIOL+CALCIFEROL SERPL-MC: NORMAL UG/L (ref 20–75)
VITAMIN D2 SERPL-MCNC: <5 UG/L
VITAMIN D3 SERPL-MCNC: 49 UG/L

## 2017-08-20 NOTE — PROGRESS NOTES
Please call or write patient with the following results:    -Kidney function is normal (Cr, GFR), Sodium is normal, Potassium is normal, Calcium is normal, Glucose is normal (diabetes screening test).   -Vitamin D level is normal, continue your current supplementation regimen as reviewed in clinic.     Electronically Signed By: Sobeida Iraheta PA-C

## 2017-09-11 NOTE — NURSING NOTE
"Chief Complaint   Patient presents with     Cough    Pulse 131  Temp(Src) 98  F (36.7  C) (Oral)  Ht 4' 11.25\" (1.505 m)  Wt 122 lb (55.339 kg)  BMI 24.43 kg/m2  SpO2 92%  Breastfeeding? No Body Mass Index is Body mass index is 24.43 kg/(m^2).  BP completed using cuff size : regular right arm  Lore Kaplan MA          " DISPLAY PLAN FREE TEXT

## 2018-01-29 DIAGNOSIS — E78.5 HYPERLIPIDEMIA LDL GOAL <130: ICD-10-CM

## 2018-01-29 LAB
CHOLEST SERPL-MCNC: 237 MG/DL
HDLC SERPL-MCNC: 57 MG/DL
LDLC SERPL CALC-MCNC: 154 MG/DL
NONHDLC SERPL-MCNC: 180 MG/DL
TRIGL SERPL-MCNC: 129 MG/DL

## 2018-01-29 PROCEDURE — 36415 COLL VENOUS BLD VENIPUNCTURE: CPT | Performed by: PHYSICIAN ASSISTANT

## 2018-01-29 PROCEDURE — 80061 LIPID PANEL: CPT | Performed by: PHYSICIAN ASSISTANT

## 2018-01-29 NOTE — LETTER
February 5, 2018      Frida Ludwigon  [address] [address] [facility] [address]        Dear ,    We are writing to inform you of your test results.    -Cholesterol levels (LDL,HDL, Triglycerides) have improved from previous profile 8 months ago. Repeat 10-yr heart disease risk assessment continues to be greater than 7.5% though as noted below. Current guidelines recommend initiation of cholesterol lowering medication. Please schedule a follow-up appt for further review as we discussed at your last appt.    The 10-year ASCVD risk score (Ward JONATHON Jr, et al., 2013) is: 11.3%    Values used to calculate the score:      Age: 67 years      Sex: Female      Is Non- : No      Diabetic: No      Tobacco smoker: No      Systolic Blood Pressure: 138 mmHg      Is BP treated: Yes      HDL Cholesterol: 57 mg/dL      Total Cholesterol: 237 mg/dL    Resulted Orders   **Lipid panel reflex to direct LDL FUTURE 1yr   Result Value Ref Range    Cholesterol 237 (H) <200 mg/dL      Comment:      Desirable:       <200 mg/dl    Triglycerides 129 <150 mg/dL    HDL Cholesterol 57 >49 mg/dL    LDL Cholesterol Calculated 154 (H) <100 mg/dL      Comment:      Above desirable:  100-129 mg/dl  Borderline High:  130-159 mg/dL  High:             160-189 mg/dL  Very high:       >189 mg/dl      Non HDL Cholesterol 180 (H) <130 mg/dL      Comment:      Above Desirable:  130-159 mg/dl  Borderline high:  160-189 mg/dl  High:             190-219 mg/dl  Very high:       >219 mg/dl         If you have any questions or concerns, please call the clinic at the number listed above.       Sincerely,        Sobeida Iraheta PA-C

## 2018-02-12 ENCOUNTER — OFFICE VISIT (OUTPATIENT)
Dept: FAMILY MEDICINE | Facility: CLINIC | Age: 68
End: 2018-02-12
Payer: COMMERCIAL

## 2018-02-12 VITALS
SYSTOLIC BLOOD PRESSURE: 126 MMHG | HEART RATE: 100 BPM | DIASTOLIC BLOOD PRESSURE: 66 MMHG | HEIGHT: 59 IN | WEIGHT: 109 LBS | OXYGEN SATURATION: 97 % | BODY MASS INDEX: 21.97 KG/M2 | TEMPERATURE: 97.6 F

## 2018-02-12 DIAGNOSIS — I10 HYPERTENSION GOAL BP (BLOOD PRESSURE) < 130/80: ICD-10-CM

## 2018-02-12 DIAGNOSIS — E78.5 HYPERLIPIDEMIA LDL GOAL <130: Primary | ICD-10-CM

## 2018-02-12 PROCEDURE — 99213 OFFICE O/P EST LOW 20 MIN: CPT | Performed by: PHYSICIAN ASSISTANT

## 2018-02-12 RX ORDER — SIMVASTATIN 10 MG
10 TABLET ORAL AT BEDTIME
Qty: 30 TABLET | Refills: 1 | Status: SHIPPED | OUTPATIENT
Start: 2018-02-12 | End: 2018-04-02

## 2018-02-12 NOTE — NURSING NOTE
"Chief Complaint   Patient presents with     Lipids     discuss cholesterol results from 1/29       Initial /58 (BP Location: Right arm, Cuff Size: Adult Regular)  Pulse 121  Temp 97.6  F (36.4  C) (Oral)  Ht 4' 11\" (1.499 m)  Wt 109 lb (49.4 kg)  SpO2 97%  Breastfeeding? No  BMI 22.02 kg/m2 Estimated body mass index is 22.02 kg/(m^2) as calculated from the following:    Height as of this encounter: 4' 11\" (1.499 m).    Weight as of this encounter: 109 lb (49.4 kg).  Medication Reconciliation: complete    "

## 2018-02-12 NOTE — PATIENT INSTRUCTIONS
Keep up all your good work with lifestyle changes.  Unfortunately, 10-yr heart disease risk assessment still does recommend stating cholesterol lowering therapy.  Will proceed with zocor.  Re-check in 4-6 weeks.    Electronically Signed By: Sobeida Iraheta PA-C

## 2018-02-12 NOTE — MR AVS SNAPSHOT
"              After Visit Summary   2/12/2018    Frida Iglesias    MRN: [id number]           Patient Information     Date Of Birth          1950        Visit Information        Provider Department      2/12/2018 10:40 AM Sobeida Iraheta PA-C [facility] [place]        Today's Diagnoses     Hyperlipidemia LDL goal <130    -  1      Care Instructions    Keep up all your good work with lifestyle changes.  Unfortunately, 10-yr heart disease risk assessment still does recommend stating cholesterol lowering therapy.  Will proceed with zocor.  Re-check in 4-6 weeks.    Electronically Signed By: Sobeida Iraheta PA-C            Follow-ups after your visit        Future tests that were ordered for you today     Open Future Orders        Priority Expected Expires Ordered    Lipid panel reflex to direct LDL Fasting Routine 4/13/2018 6/12/2018 2/12/2018    ALT Routine  4/12/2018 2/12/2018            Who to contact     If you have questions or need follow up information about today's clinic visit or your schedule please contact [facility] directly at [phone].  Normal or non-critical lab and imaging results will be communicated to you by [email or web address], letter or phone within 4 business days after the clinic has received the results. If you do not hear from us within 7 days, please contact the clinic through [facility] or phone. If you have a critical or abnormal lab result, we will notify you by phone as soon as possible.  Submit refill requests through [employer] or call your pharmacy and they will forward the refill request to us. Please allow 3 business days for your refill to be completed.          Additional Information About Your Visit        MyChart Information     [employer] lets you send messages to your doctor, view your test results, renew your prescriptions, schedule appointments and more. To sign up, go to [email or web address] . Click on \"Log in\" on the left side of the screen, which " "will take you to the Welcome page. Then click on \"Sign up Now\" on the right side of the page.     You will be asked to enter the access code listed below, as well as some personal information. Please follow the directions to create your username and password.     Your access code is: [email or web address]  Expires: 2018 11:00 AM     Your access code will  in 90 days. If you need help or a new code, please call your [place] clinic or [phone].        Care EveryWhere ID     This is your Care EveryWhere ID. This could be used by other organizations to access your [place] medical records  [id number]        Your Vitals Were     Pulse Temperature Height Pulse Oximetry Breastfeeding? BMI (Body Mass Index)    100 97.6  F (36.4  C) (Oral) 4' 11\" (1.499 m) 97% No 22.02 kg/m2       Blood Pressure from Last 3 Encounters:   18 126/66   17 138/68   17 136/68    Weight from Last 3 Encounters:   18 109 lb (49.4 kg)   17 116 lb (52.6 kg)   17 118 lb 3.2 oz (53.6 kg)                 Today's Medication Changes          These changes are accurate as of 18 11:00 AM.  If you have any questions, ask your nurse or doctor.               Start taking these medicines.        Dose/Directions    simvastatin 10 MG tablet   Commonly known as:  ZOCOR   Used for:  Hyperlipidemia LDL goal <130   Started by:  Sobeida Iraheta PA-C        Dose:  10 mg   Take 1 tablet (10 mg) by mouth At Bedtime   Quantity:  30 tablet   Refills:  1            Where to get your medicines      These medications were sent to [id number] - [place], [place] - [address] [facility] [address] [facility]  [address] [address], [place] [address]     Phone:  [phone]     simvastatin 10 MG tablet                Primary Care Provider Office Phone # Fax #    Sobeida Iraheta PA-C [phone] [address] [facility]  [place] [address]        Equal Access to Services     [facility] OFELIA AH: Lani Molina, " wadanielda sherry, qaybta kalouisa zheng, claudia gaytandavid ah. So [facility] [phone].    ATENCIÓN: Si robert price, tiene a oviedo disposición servicios gratuitos de asistencia lingüística. Honey al [phone].    We comply with applicable federal civil rights laws and [place] laws. We do not discriminate on the basis of race, color, national origin, age, disability, sex, sexual orientation, or gender identity.            Thank you!     Thank you for choosing [facility] [place]  for your care. Our goal is always to provide you with excellent care. Hearing back from our patients is one way we can continue to improve our services. Please take a few minutes to complete the written survey that you may receive in the mail after your visit with us. Thank you!             Your Updated Medication List - Protect others around you: Learn how to safely use, store and throw away your medicines at www.disposemymeds.org.          This list is accurate as of 2/12/18 11:00 AM.  Always use your most recent med list.                   Brand Name Dispense Instructions for use Diagnosis    lisinopril 10 MG tablet    PRINIVIL/ZESTRIL    90 tablet    Take 1 tablet (10 mg) by mouth daily    Hypertension goal BP (blood pressure) < 140/90       simvastatin 10 MG tablet    ZOCOR    30 tablet    Take 1 tablet (10 mg) by mouth At Bedtime    Hyperlipidemia LDL goal <130       VIACTIV 500-500-40 MG-UNT-MCG Chew   Generic drug:  calcium-vitamin D-vitamin K      Take 2 tablets by mouth daily    Osteopenia, unspecified location

## 2018-02-12 NOTE — PROGRESS NOTES
SUBJECTIVE:   Frida Iglesias is a 67 year old female who presents to clinic today for the following health issues:      Hyperlipidemia Follow-Up; has been doing well making changes and overall cholesterol has improved from previous.  Tried to cut back on fatty and sugary foods.  States her BP is a lot better too - has been tracking it at home.  Brings her calendar and review shows that only 13 out of 60 readings are above goal 130/80.  Did better in the summer where she could get outside more too.    25x of going up and down the stairs and actively walking inside her house.      Rate your low fat/cholesterol diet?: good    Taking statin?  Not currently    Other lipid medications/supplements?:  none      Amount of exercise or physical activity:     Problems taking medications regularly: No    Medication side effects: none    Diet: low salt    The 10-year ASCVD risk score (Jaz HOLT Jr, et al., 2013) is: 9.5%    Values used to calculate the score:      Age: 67 years      Sex: Female      Is Non- : No      Diabetic: No      Tobacco smoker: No      Systolic Blood Pressure: 126 mmHg      Is BP treated: Yes      HDL Cholesterol: 57 mg/dL      Total Cholesterol: 237 mg/dL      Problem list and histories reviewed & adjusted, as indicated.  Additional history: as documented    Patient Active Problem List   Diagnosis     Advanced directives, counseling/discussion     CARDIOVASCULAR SCREENING; LDL GOAL LESS THAN 160     Personal history of colonic polyps     Osteopenia     Post-menopause     Hyperlipidemia LDL goal <130     Hypertension goal BP (blood pressure) < 130/80     No past surgical history on file.    Social History   Substance Use Topics     Smoking status: Former Smoker     Packs/day: 1.50     Years: 10.00     Types: Cigarettes     Quit date: 8/5/1991     Smokeless tobacco: Never Used     Alcohol use No     No family history on file.      Current Outpatient Prescriptions   Medication Sig  "Dispense Refill     simvastatin (ZOCOR) 10 MG tablet Take 1 tablet (10 mg) by mouth At Bedtime 30 tablet 1     lisinopril (PRINIVIL/ZESTRIL) 10 MG tablet Take 1 tablet (10 mg) by mouth daily 90 tablet 3     calcium-vitamin D-vitamin K (VIACTIV) 500-500-40 MG-UNT-MCG CHEW Take 2 tablets by mouth daily  0     No Known Allergies    Reviewed and updated as needed this visit by clinical staff  Tobacco  Allergies  Meds       Reviewed and updated as needed this visit by Provider         ROS:  Constitutional, HEENT, cardiovascular, pulmonary, gi and gu systems are negative, except as otherwise noted.    OBJECTIVE:     /66  Pulse 100  Temp 97.6  F (36.4  C) (Oral)  Ht 4' 11\" (1.499 m)  Wt 109 lb (49.4 kg)  SpO2 97%  Breastfeeding? No  BMI 22.02 kg/m2  Body mass index is 22.02 kg/(m^2).  GENERAL: healthy, alert and no distress    Diagnostic Test Results:  Results for orders placed or performed in visit on 01/29/18   **Lipid panel reflex to direct LDL FUTURE 1yr   Result Value Ref Range    Cholesterol 237 (H) <200 mg/dL    Triglycerides 129 <150 mg/dL    HDL Cholesterol 57 >49 mg/dL    LDL Cholesterol Calculated 154 (H) <100 mg/dL    Non HDL Cholesterol 180 (H) <130 mg/dL       ASSESSMENT/PLAN:       ICD-10-CM    1. Hyperlipidemia LDL goal <130 E78.5 simvastatin (ZOCOR) 10 MG tablet     Lipid panel reflex to direct LDL Fasting     ALT   2. Hypertension goal BP (blood pressure) < 130/80 I10    Did have improvement in cholesterol with making life-style changes.  Repeat 10-yr CV risk assessment though is still >7.5% at 9.5% today.  Pt is not amenable to starting statin therapy.  Risks/benefits reviewed with her.  Repeat lipid and ALT in 4-6 weeks.  See Patient Instructions  Pt in agreement with plan.   Patient Instructions   Keep up all your good work with lifestyle changes.  Unfortunately, 10-yr heart disease risk assessment still does recommend stating cholesterol lowering therapy.  Will proceed with " zocor.  Re-check in 4-6 weeks.    Electronically Signed By: Sobeida Iraheta PA-C

## 2018-03-20 DIAGNOSIS — E78.5 HYPERLIPIDEMIA LDL GOAL <130: ICD-10-CM

## 2018-03-20 LAB
ALT SERPL W P-5'-P-CCNC: 16 U/L (ref 0–50)
CHOLEST SERPL-MCNC: 167 MG/DL
HDLC SERPL-MCNC: 56 MG/DL
LDLC SERPL CALC-MCNC: 94 MG/DL
NONHDLC SERPL-MCNC: 111 MG/DL
TRIGL SERPL-MCNC: 87 MG/DL

## 2018-03-20 PROCEDURE — 84460 ALANINE AMINO (ALT) (SGPT): CPT | Performed by: PHYSICIAN ASSISTANT

## 2018-03-20 PROCEDURE — 36415 COLL VENOUS BLD VENIPUNCTURE: CPT | Performed by: PHYSICIAN ASSISTANT

## 2018-03-20 PROCEDURE — 80061 LIPID PANEL: CPT | Performed by: PHYSICIAN ASSISTANT

## 2018-03-20 NOTE — LETTER
March 22, 2018      Frida Iglesias  [address] [facility] [address]        Dear ,    We are writing to inform you of your test results.    Cholesterol levels are at your goal levels.  ADVISE: Continuing your medication, a regular exercise program with at least 30 minutes of aerobic exercise 3-4 days/week ( 45 minutes 4-6 days/week if weight loss needed), and a low saturated fat,/low carbohydrate diet are helpful to maintain this.  Rechecking your fasting cholesterol panel in 12 months is recommended (Lipid w/ LDL reflex).   -Liver test (ALT) is normal.     Resulted Orders   Lipid panel reflex to direct LDL Fasting   Result Value Ref Range    Cholesterol 167 <200 mg/dL    Triglycerides 87 <150 mg/dL      Comment:      Fasting specimen    HDL Cholesterol 56 >49 mg/dL    LDL Cholesterol Calculated 94 <100 mg/dL      Comment:      Desirable:       <100 mg/dl    Non HDL Cholesterol 111 <130 mg/dL   ALT   Result Value Ref Range    ALT 16 0 - 50 U/L       If you have any questions or concerns, please call the clinic at the number listed above.       Sincerely,    Sobeida Iraheta PA-C    [facility]

## 2018-03-22 NOTE — PROGRESS NOTES
Please call or write patient with the following results:    -Cholesterol levels are at your goal levels.  ADVISE: Continuing your medication, a regular exercise program with at least 30 minutes of aerobic exercise 3-4 days/week ( 45 minutes 4-6 days/week if weight loss needed), and a low saturated fat,/low carbohydrate diet are helpful to maintain this.  Rechecking your fasting cholesterol panel in 12 months is recommended (Lipid w/ LDL reflex).  -Liver test (ALT) is normal.    Electronically Signed By: Sobeida Iraheta PA-C

## 2018-04-02 DIAGNOSIS — E78.5 HYPERLIPIDEMIA LDL GOAL <130: ICD-10-CM

## 2018-04-02 RX ORDER — SIMVASTATIN 10 MG
TABLET ORAL
Qty: 90 TABLET | Refills: 1 | Status: SHIPPED | OUTPATIENT
Start: 2018-04-02 | End: 2018-09-02

## 2018-04-02 NOTE — TELEPHONE ENCOUNTER
"Requested Prescriptions   Pending Prescriptions Disp Refills     simvastatin (ZOCOR) 10 MG tablet [Pharmacy Med Name: Simvastatin Oral Tablet 10 MG]  Last Written Prescription Date:  2/12/18  Last Fill Quantity: 30,  # refills: 1   Last office visit: 2/12/2018 with prescribing provider:  Edin   Future Office Visit:     30 tablet 0     Sig: Take 1 tablet (10 mg) by mouth at bedtime    Statins Protocol Passed    4/2/2018  7:00 AM       Passed - LDL on file in past 12 months    Recent Labs   Lab Test  03/20/18   1005   LDL  94            Passed - No abnormal creatine kinase in past 12 months    No lab results found.            Passed - Recent (12 mo) or future (30 days) visit within the authorizing provider's specialty    Patient had office visit in the last 12 months or has a visit in the next 30 days with authorizing provider or within the authorizing provider's specialty.  See \"Patient Info\" tab in inbasket, or \"Choose Columns\" in Meds & Orders section of the refill encounter.           Passed - Patient is age 18 or older       Passed - No active pregnancy on record       Passed - No positive pregnancy test in past 12 months          Last Written Prescription Date:  2/12/18  Last Fill Quantity: 30,  # refills: 1   Last office visit: 2/12/2018 with prescribing provider:  [place]   Future Office Visit:      "

## 2018-06-05 ENCOUNTER — OFFICE VISIT (OUTPATIENT)
Dept: FAMILY MEDICINE | Facility: CLINIC | Age: 68
End: 2018-06-05
Payer: COMMERCIAL

## 2018-06-05 VITALS
SYSTOLIC BLOOD PRESSURE: 130 MMHG | BODY MASS INDEX: 21.37 KG/M2 | DIASTOLIC BLOOD PRESSURE: 66 MMHG | HEART RATE: 88 BPM | HEIGHT: 59 IN | WEIGHT: 106 LBS | TEMPERATURE: 98.3 F | OXYGEN SATURATION: 97 %

## 2018-06-05 DIAGNOSIS — Z12.11 SPECIAL SCREENING FOR MALIGNANT NEOPLASMS, COLON: ICD-10-CM

## 2018-06-05 DIAGNOSIS — Z00.00 ENCOUNTER FOR ROUTINE ADULT HEALTH EXAMINATION WITHOUT ABNORMAL FINDINGS: Primary | ICD-10-CM

## 2018-06-05 DIAGNOSIS — Z80.0 FAMILY HISTORY OF COLON CANCER IN FATHER: ICD-10-CM

## 2018-06-05 DIAGNOSIS — Z86.0100 HISTORY OF COLONIC POLYPS: ICD-10-CM

## 2018-06-05 DIAGNOSIS — I10 HYPERTENSION GOAL BP (BLOOD PRESSURE) < 130/80: ICD-10-CM

## 2018-06-05 DIAGNOSIS — E78.5 HYPERLIPIDEMIA LDL GOAL <130: ICD-10-CM

## 2018-06-05 PROCEDURE — G0439 PPPS, SUBSEQ VISIT: HCPCS | Performed by: PHYSICIAN ASSISTANT

## 2018-06-05 NOTE — MR AVS SNAPSHOT
After Visit Summary   6/5/2018    Frida Iglesias    MRN: [id number]           Patient Information     Date Of Birth          1950        Visit Information        Provider Department      6/5/2018 9:20 AM Sobeida Iraheta PA-C [facility] [place]        Today's Diagnoses     Encounter for routine adult health examination without abnormal findings    -  1    Hyperlipidemia LDL goal <130        Hypertension goal BP (blood pressure) < 130/80        History of colonic polyps        Special screening for malignant neoplasms, colon        Family history of colon cancer in father - age 82          Care Instructions      Preventive Health Recommendations  Female Ages 65 +    Yearly exam:     See your health care provider every year in order to  o Review health changes.   o Discuss preventive care.    o Review your medicines if your doctor has prescribed any.      You no longer need a yearly Pap test unless you've had an abnormal Pap test in the past 10 years. If you have vaginal symptoms, such as bleeding or discharge, be sure to talk with your provider about a Pap test.      Every 1 to 2 years, have a mammogram.  If you are over 69, talk with your health care provider about whether or not you want to continue having screening mammograms.      Every 10 years, have a colonoscopy. Or, have a yearly FIT test (stool test). These exams will check for colon cancer.       Have a cholesterol test every 5 years, or more often if your doctor advises it.       Have a diabetes test (fasting glucose) every three years. If you are at risk for diabetes, you should have this test more often.       At age 65, have a bone density scan (DEXA) to check for osteoporosis (brittle bone disease).    Shots:    Get a flu shot each year.    Get a tetanus shot every 10 years.    Talk to your doctor about your pneumonia vaccines. There are now two you should receive - Pneumovax (PPSV 23) and Prevnar (PCV 13).    Talk  to your doctor about the shingles vaccine.    Talk to your doctor about the hepatitis B vaccine.    Nutrition:     Eat at least 5 servings of fruits and vegetables each day.      Eat whole-grain bread, whole-wheat pasta and brown rice instead of white grains and rice.      Talk to your provider about Calcium and Vitamin D.     Lifestyle    Exercise at least 150 minutes a week (30 minutes a day, 5 days a week). This will help you control your weight and prevent disease.      Limit alcohol to one drink per day.      No smoking.       Wear sunscreen to prevent skin cancer.       See your dentist twice a year for an exam and cleaning.      See your eye doctor every 1 to 2 years to screen for conditions such as glaucoma, macular degeneration, cataracts, etc           Follow-ups after your visit        Additional Services     GASTROENTEROLOGY ADULT REF PROCEDURE ONLY Hardik Ranjith [phone]; (per pt insurance)       Last Lab Result: Creatinine (mg/dL)       Date                     Value                 08/09/2017               0.64             ----------  Body mass index is 21.41 kg/(m^2).      Patient will be contacted to schedule procedure.     Please be aware that coverage of these services is subject to the terms and limitations of your health insurance plan.  Call member services at your health plan with any benefit or coverage questions.  Any procedures must be performed at a [facility] facility OR coordinated by your clinic's referral office.    Please bring the following with you to your appointment:    (1) Any X-Rays, CTs or MRIs which have been performed.  Contact the facility where they were done to arrange for  prior to your scheduled appointment.    (2) List of current medications   (3) This referral request   (4) Any documents/labs given to you for this referral                  Follow-up notes from your care team     Return in about 2 months (around 8/5/2018) for Lab Work - can refill meds for  "1 yr.      Your next 10 appointments already scheduled     Jun 13, 2018 10:00 AM CDT   MA SCREENING BILATERAL W/ ODALYS with [id number]   [facility] ([facility])    [address], [address]  [facility] [id number] [phone]           Three-dimensional (3D) mammograms are available at [facility] locations in [place], [place], [place], [place], [facility], [place], [place], and [place]. -Health locations include [place] and [facility] in [place]. Benefits of 3D mammograms include: - Improved rate of cancer detection - Decreases your chance of having to go back for more tests, which means fewer: - \"False-positive\" results (This means that there is an abnormal area but it isn't cancer.) - Invasive testing procedures, such as a biopsy or surgery - Can provide clearer images of the breast if you have dense breast tissue. 3D mammography is an optional exam that anyone can have with a 2D mammogram. It doesn't replace or take the place of a 2D mammogram. 2D mammograms remain an effective screening test for all women.  Not all insurance companies cover the cost of a 3D mammogram. Check with your insurance.              Future tests that were ordered for you today     Open Future Orders        Priority Expected Expires Ordered    **Basic metabolic panel FUTURE 2mo Routine 9/5/2018 10/3/2018 6/5/2018    Albumin Random Urine Quantitative with Creat Ratio Routine 8/4/2018 10/3/2018 6/5/2018            Who to contact     If you have questions or need follow up information about today's clinic visit or your schedule please contact [facility] [place] directly at [phone].  Normal or non-critical lab and imaging results will be communicated to you by MyChart, letter or phone within 4 business days after the clinic has received the results. If you do not hear from us within 7 days, please contact the clinic through MyChart or phone. If you have a critical or " "abnormal lab result, we will notify you by phone as soon as possible.  Submit refill requests through [employer] or call your pharmacy and they will forward the refill request to us. Please allow 3 business days for your refill to be completed.          Additional Information About Your Visit        Care EveryWhere ID     This is your Care EveryWhere ID. This could be used by other organizations to access your [place] medical records  [id number]        Your Vitals Were     Pulse Temperature Height Pulse Oximetry BMI (Body Mass Index)       88 98.3  F (36.8  C) (Oral) 4' 11\" (1.499 m) 97% 21.41 kg/m2        Blood Pressure from Last 3 Encounters:   06/05/18 130/66   02/12/18 126/66   08/09/17 138/68    Weight from Last 3 Encounters:   06/05/18 106 lb (48.1 kg)   02/12/18 109 lb (49.4 kg)   08/09/17 116 lb (52.6 kg)              We Performed the Following     GASTROENTEROLOGY ADULT REF PROCEDURE ONLY Hardik Kasper [phone]; (per pt insurance)        Primary Care Provider Office Phone # Fax #    Sobeida Iraheta PA-C [phone] [address] [facility] [address]        Equal Access to Services     [facility] OFELIA : Hadii aad ku hadasho Soomaali, waaxda luqadaha, qaybta kaalmada adeegyada, waxay sunny hayrominan michelle fox . So [facility] [phone].    ATENCIÓN: Si habla español, tiene a oviedo disposición servicios gratuitos de asistencia lingüística. julisa al [phone].    We comply with applicable federal civil rights laws and [place] laws. We do not discriminate on the basis of race, color, national origin, age, disability, sex, sexual orientation, or gender identity.            Thank you!     Thank you for choosing [facility]  for your care. Our goal is always to provide you with excellent care. Hearing back from our patients is one way we can continue to improve our services. Please take a few minutes to complete the written survey that you may receive in the " mail after your visit with us. Thank you!             Your Updated Medication List - Protect others around you: Learn how to safely use, store and throw away your medicines at www.disposemymeds.org.          This list is accurate as of 6/5/18 10:00 AM.  Always use your most recent med list.                   Brand Name Dispense Instructions for use Diagnosis    lisinopril 10 MG tablet    PRINIVIL/ZESTRIL    90 tablet    Take 1 tablet (10 mg) by mouth daily    Hypertension goal BP (blood pressure) < 140/90       simvastatin 10 MG tablet    ZOCOR    90 tablet    Take 1 tablet (10 mg) by mouth at bedtime    Hyperlipidemia LDL goal <130       VIACTIV 500-500-40 MG-UNT-MCG Chew   Generic drug:  calcium-vitamin D-vitamin K      Take 2 tablets by mouth daily    Osteopenia, unspecified location

## 2018-06-05 NOTE — PROGRESS NOTES
SUBJECTIVE:   Frida Iglesias is a 67 year old female who presents for Preventive Visit.    Are you in the first 12 months of your Medicare Part B coverage?  No    Healthy Habits:    Do you get at least three servings of calcium containing foods daily (dairy, green leafy vegetables, etc.)? yes    Amount of exercise or daily activities, outside of work: 6 day(s) per week    Problems taking medications regularly No    Medication side effects: No    Have you had an eye exam in the past two years? no    Do you see a dentist twice per year? yes    Do you have sleep apnea, excessive snoring or daytime drowsiness?no      Ability to successfully perform activities of daily living: Yes, no assistance needed    Home safety:  none identified     Hearing impairment: No    Fall risk:        Continues to do well with her BP. Brings log for review. Of 42 readings - these are all at goal excluding 1 which was minimally elevated. 131/61.     Started on zocor in Feb and this is going well. No side effects. Lipid re-check and liver enzyme were normal after 4-6 weeks of therapy in March. She will continue med without changes.      COGNITIVE SCREEN  1) Repeat 3 items (Banana, Sunrise, Chair)    2) Clock draw: NORMAL  3) 3 item recall: Recalls 1 object   Results: NORMAL clock, 1-2 items recalled: COGNITIVE IMPAIRMENT LESS LIKELY    Mini-CogTM Copyright S Hector. Licensed by the author for use in [facility]; reprinted with permission (koki@.[facility]). All rights reserved.          Reviewed and updated as needed this visit by clinical staff  Tobacco  Allergies  Meds  Med Hx  Surg Hx  Fam Hx  Soc Hx        Reviewed and updated as needed this visit by Provider  Tobacco  Allergies  Meds  Med Hx  Surg Hx  Fam Hx  Soc Hx       Social History   Substance Use Topics     Smoking status: Former Smoker     Packs/day: 1.50     Years: 10.00     Types: Cigarettes     Quit date: 8/5/1991     Smokeless tobacco: Never Used      Alcohol use No       If you drink alcohol do you typically have >3 drinks per day or >7 drinks per week? No                        Today's PHQ-2 Score:   PHQ-2 ( 1999 Pfizer) 6/5/2017   Q1: Little interest or pleasure in doing things 0   Q2: Feeling down, depressed or hopeless 0   PHQ-2 Score 0       Do you feel safe in your environment - Yes    Do you have a Health Care Directive?: No: Advance care planning reviewed with patient; information given to patient to review.      Current providers sharing in care for this patient include:   Patient Care Team:  Sobeida Iraheta PA-C as PCP - General (Physician Assistant - Medical)    The following health maintenance items are reviewed in Epic and correct as of today:  Health Maintenance   Topic Date Due     MAMMO Q1 YR  06/13/2018     FALL RISK ASSESSMENT  08/09/2018     COLONOSCOPY Q5 YR  12/09/2018     ADVANCE DIRECTIVE PLANNING Q5 YRS  06/05/2022     LIPID SCREEN Q5 YR FEMALE (SYSTEM ASSIGNED)  03/20/2023     TETANUS IMMUNIZATION (SYSTEM ASSIGNED)  09/16/2023     DEXA SCAN SCREENING (SYSTEM ASSIGNED)  Completed     PNEUMOCOCCAL  Completed     INFLUENZA VACCINE  Completed     HEPATITIS C SCREENING  Completed     BP Readings from Last 3 Encounters:   06/05/18 130/66   02/12/18 126/66   08/09/17 138/68    Wt Readings from Last 3 Encounters:   06/05/18 106 lb (48.1 kg)   02/12/18 109 lb (49.4 kg)   08/09/17 116 lb (52.6 kg)                  Patient Active Problem List   Diagnosis     Advanced directives, counseling/discussion     CARDIOVASCULAR SCREENING; LDL GOAL LESS THAN 160     History of colonic polyps     Osteopenia - repeat in 5 years     Post-menopause     Hyperlipidemia LDL goal <130     Hypertension goal BP (blood pressure) < 130/80     Family history of colon cancer in father - age 82     History reviewed. No pertinent surgical history.    Social History   Substance Use Topics     Smoking status: Former Smoker     Packs/day: 1.50     Years: 10.00      "Types: Cigarettes     Quit date: 8/5/1991     Smokeless tobacco: Never Used     Alcohol use No     Family History   Problem Relation Age of Onset     Hypertension Father      Colon Cancer Father      Breast Cancer Paternal Grandmother      DIABETES No family hx of      Coronary Artery Disease No family hx of      Hyperlipidemia No family hx of      CEREBROVASCULAR DISEASE No family hx of          Current Outpatient Prescriptions   Medication Sig Dispense Refill     calcium-vitamin D-vitamin K (VIACTIV) 500-500-40 MG-UNT-MCG CHEW Take 2 tablets by mouth daily  0     lisinopril (PRINIVIL/ZESTRIL) 10 MG tablet Take 1 tablet (10 mg) by mouth daily 90 tablet 3     simvastatin (ZOCOR) 10 MG tablet Take 1 tablet (10 mg) by mouth at bedtime 90 tablet 1     No Known Allergies    Pneumonia Vaccine: up to date  Mammogram Screening: Patient over age 50, mutual decision to screen reflected in health maintenance.  History of abnormal Pap smear: NO - age 65 - see link Cervical Cytology Screening Guidelines    ROS:  Constitutional, HEENT, cardiovascular, pulmonary, gi and gu systems are negative, except as otherwise noted.    OBJECTIVE:   /66  Pulse 88  Temp 98.3  F (36.8  C) (Oral)  Ht 4' 11\" (1.499 m)  Wt 106 lb (48.1 kg)  SpO2 97%  BMI 21.41 kg/m2 Estimated body mass index is 21.41 kg/(m^2) as calculated from the following:    Height as of this encounter: 4' 11\" (1.499 m).    Weight as of this encounter: 106 lb (48.1 kg).  EXAM:   GENERAL: healthy, alert and no distress  EYES: Eyes grossly normal to inspection, PERRL and conjunctivae and sclerae normal  HENT: ear canals and TM's normal, nose and mouth without ulcers or lesions  NECK: no adenopathy, no asymmetry, masses, or scars and thyroid normal to palpation  RESP: lungs clear to auscultation - no rales, rhonchi or wheezes  BREAST: deferred  CV: regular rate and rhythm, normal S1 S2, no S3 or S4, no murmur, click or rub, no peripheral edema and peripheral pulses " strong  ABDOMEN: soft, nontender, no hepatosplenomegaly, no masses and bowel sounds normal  MS: no gross musculoskeletal defects noted, no edema  SKIN: no suspicious lesions or rashes  NEURO: Normal strength and tone, mentation intact and speech normal  PSYCH: mentation appears normal, affect normal/bright    ASSESSMENT / PLAN:       ICD-10-CM    1. Encounter for routine adult health examination without abnormal findings Z00.00    2. Hyperlipidemia LDL goal <130 E78.5    3. Hypertension goal BP (blood pressure) < 130/80 I10 **Basic metabolic panel FUTURE 2mo     Albumin Random Urine Quantitative with Creat Ratio   4. History of colonic polyps Z86.010 GASTROENTEROLOGY ADULT REF PROCEDURE ONLY [facility]  [phone]; (per pt insurance)   5. Special screening for malignant neoplasms, colon Z12.11 GASTROENTEROLOGY ADULT REF PROCEDURE ONLY [facility]  [phone]; (per pt insurance)   6. Family history of colon cancer in father - age 82 Z80.0 GASTROENTEROLOGY ADULT REF PROCEDURE ONLY [facility]  [phone]; (per pt insurance)   BP stable - pt will be due for labs in August. Future orders placed. Can re-fill for another 1 yr at that time.  Will try to coincide labs and chronic meds together next year.  Due for colonoscopy in Dec. Orders placed.  Lipids improved after starting zocor - pt will continue without changes.  Has mammogram scheduled already for 6/13/28.    End of Life Planning:  Patient currently has an advanced directive: No.  I have verified the patient's ablity to prepare an advanced directive/make health care decisions.  Literature was provided to assist patient in preparing an advanced directive.    COUNSELING:  Reviewed preventive health counseling, as reflected in patient instructions       Regular exercise       Healthy diet/nutrition       Osteoporosis Prevention/Bone Health       Colon cancer screening       Hepatitis C screening      Estimated body mass index is 21.41 kg/(m^2)  "as calculated from the following:    Height as of this encounter: 4' 11\" (1.499 m).    Weight as of this encounter: 106 lb (48.1 kg).       reports that she quit smoking about 26 years ago. Her smoking use included Cigarettes. She has a 15.00 pack-year smoking history. She has never used smokeless tobacco.      Appropriate preventive services were discussed with this patient, including applicable screening as appropriate for cardiovascular disease, diabetes, osteopenia/osteoporosis, and glaucoma.  As appropriate for age/gender, discussed screening for colorectal cancer, prostate cancer, breast cancer, and cervical cancer. Checklist reviewing preventive services available has been given to the patient.    Reviewed patients plan of care and provided an AVS. The Basic Care Plan (routine screening as documented in Health Maintenance) for Frida meets the Care Plan requirement. This Care Plan has been established and reviewed with the Patient.    Counseling Resources:  ATP IV Guidelines  Pooled Cohorts Equation Calculator  Breast Cancer Risk Calculator  FRAX Risk Assessment  ICSI Preventive Guidelines  Dietary Guidelines for Americans, 2010  USDA's MyPlate  ASA Prophylaxis  Lung CA Screening    Sobeida Iraheta PA-C  [facility]  "

## 2018-06-13 ENCOUNTER — HOSPITAL ENCOUNTER (OUTPATIENT)
Dept: MAMMOGRAPHY | Facility: CLINIC | Age: 68
Discharge: HOME | End: 2018-06-13
Attending: PHYSICIAN ASSISTANT | Admitting: PHYSICIAN ASSISTANT
Payer: COMMERCIAL

## 2018-06-13 DIAGNOSIS — Z12.31 VISIT FOR SCREENING MAMMOGRAM: ICD-10-CM

## 2018-06-13 PROCEDURE — 77067 SCR MAMMO BI INCL CAD: CPT

## 2018-06-13 NOTE — LETTER
June 13, 2018      Frida Iglesias  [address] [address] [address]        Dear ,    We are writing to inform you of your test results.    Mammogram was normal.  ADVISE: rechecking in 1 year.    Resulted Orders   MA Screening Digital Bilateral    Narrative    SCREENING MAMMOGRAM, BILATERAL, DIGITAL w/CAD, 6/13/2018 10:21 AM    BREAST DENSITY: Scattered fibroglandular densities.    CLINICAL INFORMATION: Breast screening.  ; Visit for screening  mammogram, 6/13/17, 12/2/13    FINDINGS: Negative. Stable exam. Screening exam in one year  recommended.      Impression    IMPRESSION: BI-RADS CATEGORY: 1 -  Negative.    RECOMMENDED FOLLOW-UP: Annual Mammography.      SLIM MUNGUIA MD       If you have any questions or concerns, please call the clinic at the number listed above.       Sincerely,        Sobeida Iraheta PA-C

## 2018-06-13 NOTE — PROGRESS NOTES
Please call or write patient with the following results:    -Mammogram was normal.  ADVISE: rechecking in 1 year.    Electronically Signed By: Sobeida Iraheta PA-C

## 2018-06-15 ENCOUNTER — OFFICE VISIT (OUTPATIENT)
Dept: FAMILY MEDICINE | Facility: CLINIC | Age: 68
End: 2018-06-15
Payer: COMMERCIAL

## 2018-06-15 VITALS
WEIGHT: 107.7 LBS | SYSTOLIC BLOOD PRESSURE: 128 MMHG | BODY MASS INDEX: 21.71 KG/M2 | TEMPERATURE: 98.3 F | HEART RATE: 104 BPM | HEIGHT: 59 IN | OXYGEN SATURATION: 100 % | DIASTOLIC BLOOD PRESSURE: 75 MMHG

## 2018-06-15 DIAGNOSIS — L23.7 CONTACT DERMATITIS DUE TO POISON IVY: Primary | ICD-10-CM

## 2018-06-15 DIAGNOSIS — I10 HYPERTENSION GOAL BP (BLOOD PRESSURE) < 130/80: ICD-10-CM

## 2018-06-15 LAB
ANION GAP SERPL CALCULATED.3IONS-SCNC: 7 MMOL/L (ref 3–14)
BUN SERPL-MCNC: 15 MG/DL (ref 7–30)
CALCIUM SERPL-MCNC: 9.5 MG/DL (ref 8.5–10.1)
CHLORIDE SERPL-SCNC: 104 MMOL/L (ref 94–109)
CO2 SERPL-SCNC: 30 MMOL/L (ref 20–32)
CREAT SERPL-MCNC: 0.61 MG/DL (ref 0.52–1.04)
CREAT UR-MCNC: 34 MG/DL
GFR SERPL CREATININE-BSD FRML MDRD: >90 ML/MIN/1.7M2
GLUCOSE SERPL-MCNC: 98 MG/DL (ref 70–99)
MICROALBUMIN UR-MCNC: <5 MG/L
MICROALBUMIN/CREAT UR: NORMAL MG/G CR (ref 0–25)
POTASSIUM SERPL-SCNC: 3.9 MMOL/L (ref 3.4–5.3)
SODIUM SERPL-SCNC: 141 MMOL/L (ref 133–144)

## 2018-06-15 PROCEDURE — 80048 BASIC METABOLIC PNL TOTAL CA: CPT | Performed by: PHYSICIAN ASSISTANT

## 2018-06-15 PROCEDURE — 99214 OFFICE O/P EST MOD 30 MIN: CPT | Performed by: FAMILY MEDICINE

## 2018-06-15 PROCEDURE — 36415 COLL VENOUS BLD VENIPUNCTURE: CPT | Performed by: PHYSICIAN ASSISTANT

## 2018-06-15 PROCEDURE — 82043 UR ALBUMIN QUANTITATIVE: CPT | Performed by: PHYSICIAN ASSISTANT

## 2018-06-15 RX ORDER — TRIAMCINOLONE ACETONIDE 1 MG/G
CREAM TOPICAL
Qty: 30 G | Refills: 0 | Status: SHIPPED | OUTPATIENT
Start: 2018-06-15 | End: 2019-06-11

## 2018-06-15 RX ORDER — LISINOPRIL 10 MG/1
10 TABLET ORAL DAILY
Qty: 90 TABLET | Refills: 3 | Status: SHIPPED | OUTPATIENT
Start: 2018-06-15 | End: 2019-05-29

## 2018-06-15 NOTE — MR AVS SNAPSHOT
After Visit Summary   6/15/2018    Frida Iglesias    MRN: [id number]           Patient Information     Date Of Birth          1950        Visit Information        Provider Department      6/15/2018 8:40 AM Sean Claudio Jr., MD [facility]        Today's Diagnoses     Contact dermatitis due to poison ivy    -  1    Hypertension goal BP (blood pressure) < 130/80           Follow-ups after your visit        Follow-up notes from your care team     Return in about 1 year (around 6/15/2019) for Preventative Visit.      Your next 10 appointments already scheduled     Jul 03, 2018   Procedure with Manuel Garcia MD   [facility] ([facility])    [address] [place] [address] [phone]           [facility] is located at [address] [place]            Aug 07, 2018 10:00 AM CDT   LAB with [facility]   [facility] ([facility]    [address] [address] [phone]           Please do not eat 10-12 hours before your appointment if you are coming in fasting for labs on lipids, cholesterol, or glucose (sugar). This does not apply to pregnant women. Water, hot tea and black coffee (with nothing added) are okay. Do not drink other fluids, diet soda or chew gum.              Who to contact     If you have questions or need follow up information about today's clinic visit or your schedule please contact [facility] [place] directly at [phone].  Normal or non-critical lab and imaging results will be communicated to you by MyChart, letter or phone within 4 business days after the clinic has received the results. If you do not hear from us within 7 days, please contact the clinic through MyChart or phone. If you have a critical or abnormal lab result, we will notify you by phone as soon as possible.  Submit refill requests through [employer] or call your pharmacy and they will  "forward the refill request to us. Please allow 3 business days for your refill to be completed.          Additional Information About Your Visit        Care EveryWhere ID     This is your Care EveryWhere ID. This could be used by other organizations to access your [place] medical records  [id number]        Your Vitals Were     Pulse Temperature Height Pulse Oximetry BMI (Body Mass Index)       104 98.3  F (36.8  C) (Oral) 4' 11\" (1.499 m) 100% 21.75 kg/m2        Blood Pressure from Last 3 Encounters:   06/15/18 128/75   06/05/18 130/66   02/12/18 126/66    Weight from Last 3 Encounters:   06/15/18 107 lb 11.2 oz (48.9 kg)   06/05/18 106 lb (48.1 kg)   02/12/18 109 lb (49.4 kg)              We Performed the Following     **Basic metabolic panel FUTURE 2mo     Albumin Random Urine Quantitative with Creat Ratio          Today's Medication Changes          These changes are accurate as of 6/15/18  9:05 AM.  If you have any questions, ask your nurse or doctor.               Start taking these medicines.        Dose/Directions    triamcinolone 0.1 % cream   Commonly known as:  KENALOG   Used for:  Contact dermatitis due to poison ivy   Started by:  Sean Claudio Jr., MD        Apply sparingly to affected area three times daily for 14 days.   Quantity:  30 g   Refills:  0            Where to get your medicines      These medications were sent to [id number] - [facility] [place] [address] [address] [address]     Phone:  [phone]     lisinopril 10 MG tablet    triamcinolone 0.1 % cream                Primary Care Provider Office Phone # Fax #    Sobeida Iraheta PA-C [phone] [address]  [place] [address]        Equal Access to Services     KULWINDER GILBERT AH: Hadii aad ku hadasho Soomaali, waaxda luqadaha, qaybta kaalmada adeegyada, claudia disla. So [facility] [phone].    ATENCIÓN: Si habla español, tiene a oviedo " disposición servicios gratuitos de asistencia lingüística. Honey brady [phone].    We comply with applicable federal civil rights laws and [place] laws. We do not discriminate on the basis of race, color, national origin, age, disability, sex, sexual orientation, or gender identity.            Thank you!     Thank you for choosing [facility] [place]  for your care. Our goal is always to provide you with excellent care. Hearing back from our patients is one way we can continue to improve our services. Please take a few minutes to complete the written survey that you may receive in the mail after your visit with us. Thank you!             Your Updated Medication List - Protect others around you: Learn how to safely use, store and throw away your medicines at www.disposemymeds.org.          This list is accurate as of 6/15/18  9:05 AM.  Always use your most recent med list.                   Brand Name Dispense Instructions for use Diagnosis    lisinopril 10 MG tablet    PRINIVIL/ZESTRIL    90 tablet    Take 1 tablet (10 mg) by mouth daily    Hypertension goal BP (blood pressure) < 130/80       simvastatin 10 MG tablet    ZOCOR    90 tablet    Take 1 tablet (10 mg) by mouth at bedtime    Hyperlipidemia LDL goal <130       triamcinolone 0.1 % cream    KENALOG    30 g    Apply sparingly to affected area three times daily for 14 days.    Contact dermatitis due to poison ivy       VIACTIV 500-500-40 MG-UNT-MCG Chew   Generic drug:  calcium-vitamin D-vitamin K      Take 2 tablets by mouth daily    Osteopenia, unspecified location

## 2018-06-15 NOTE — PROGRESS NOTES
SUBJECTIVE:   Frida Iglesias is a 67 year old female who presents to clinic today for the following health issues:    Rash  Onset: x7 days ago and keeps spreading. Started on left arm and is now on the right arm    Description:   Location: Both arms  Character: blotchy, red, little bumps  Itching (Pruritis): YES    Progression of Symptoms:  worsening    Accompanying Signs & Symptoms:  Fever: no   Body aches or joint pain: no   Sore throat symptoms: no   Recent cold symptoms: no     History:   Previous similar rash: YES- twice before    Precipitating factors:   Exposure to similar rash: YES  New exposures: None   Recent travel: no     Alleviating factors:none      Therapies Tried and outcome: benadryl and other OTC creams - no relief     Rash- 7 days ago pt developed a rash on her left arm that has since spread to her right arm and to her buttocks. She thinks she got this rash from when she was doing yard work and thinks the rash initially spread from her gloves. This rash is erythematous, blotchy, itchy, and is associated with small bumps. Pt has had 2 separate episodes in the past where she has had similar rash. Pt notes she has tried taking Benadryl and applying triamcinolone steroid cream that is 2 years old but none have overwhelmingly helped.    BP- Pt thinks she has 12-14 days left of her lisinopril. Her BP has been well controlled with her lisinopril.        Problem list and histories reviewed & adjusted, as indicated.  Additional history: as documented    Reviewed and updated as needed this visit by clinical staff  Tobacco  Allergies  Meds  Med Hx  Surg Hx  Fam Hx  Soc Hx      Reviewed and updated as needed this visit by Provider       ROS:  Constitutional, HEENT, cardiovascular, pulmonary, gi and gu systems are negative, except as otherwise noted.    This document serves as a record of the services and decisions personally performed and made by Sean Claudio MD. It was created on his behalf  "by Saji Kaur, a trained medical scribe. The creation of this document is based on the provider's statements to the medical scribe.  Saji Kaur 8:54 AM Alis 15, 2018  OBJECTIVE:     /75 (BP Location: Left arm, Patient Position: Sitting, Cuff Size: Adult Regular)  Pulse 104  Temp 98.3  F (36.8  C) (Oral)  Ht 1.499 m (4' 11\")  Wt 48.9 kg (107 lb 11.2 oz)  SpO2 100%  BMI 21.75 kg/m2  Body mass index is 21.75 kg/(m^2).  GENERAL: healthy, alert and no distress  NECK: no adenopathy, no asymmetry, masses, or scars and thyroid normal to palpation  RESP: lungs clear to auscultation - no rales, rhonchi or wheezes  CV: regular rate and rhythm, normal S1 S2, no S3 or S4, no murmur, click or rub, no peripheral edema and peripheral pulses strong  SKIN: Erythematous, diffuse, blistering rash noted on left and right forearms.    Diagnostic Test Results:  No results found for this or any previous visit (from the past 24 hour(s)).    ASSESSMENT/PLAN:     (L23.7) Contact dermatitis due to poison ivy  (primary encounter diagnosis)  Comment: Discussed with pt that poison ivy is a 4 week illness and usually develops on areas where the skin is thinnest. However, given she has had this rash for 7 days, discussed with pt that this rash is likely done spreading. Given she has had success with steroid cream for similar rash in past, will refill her triamcinolone steroid cream and recommended she continue applying this on the affected areas. If this rash does indeed radiate to her face, reassured pt that she can apply steroid cream to her face to help resolve the rash. In addition, discussed with pt that she can also take Claritin as needed to help with her symptoms. Lastly, discussed with her that if she needs more triamcinolone cream, advised her to follow up for refill.  Plan: triamcinolone (KENALOG) 0.1 % cream        Follow up if needed.    (I10) Hypertension goal BP (blood pressure) < 130/80  Comment: Pt had appointment " in August to do blood work and refill her lisinopril. However, given her BP was good today, I offered to refill her lisinopril and do blood work today; pt was in agreement with doing this.  BMP and microalbumin completed today as well.  Plan: lisinopril (PRINIVIL/ZESTRIL) 10 MG tablet        Advised pt to follow up with Sobeida Iraheta in 1 year for her next preventative visit.    The information in this document, created by the medical scribe for me, accurately reflects the services I personally performed and the decisions made by me. I have reviewed and approved this document for accuracy prior to leaving the patient care area.  Alis 15, 2018 8:54 AM    Sean Claudio Jr, MD  [facility]

## 2018-06-15 NOTE — LETTER
June 18, 2018      Frida Iglesias  [address] [address] [facility] [address]        Dear ,    We are writing to inform you of your test results.    -Kidney function is normal (Cr, GFR), Sodium is normal, Potassium is normal, Calcium is normal, Glucose is normal (diabetes screening test).   -Microalbumin (urine protein) test is normal.  ADVISE: recheck annually   Continue medications without changes.     Resulted Orders   **Basic metabolic panel FUTURE 2mo   Result Value Ref Range    Sodium 141 133 - 144 mmol/L    Potassium 3.9 3.4 - 5.3 mmol/L    Chloride 104 94 - 109 mmol/L    Carbon Dioxide 30 20 - 32 mmol/L    Anion Gap 7 3 - 14 mmol/L    Glucose 98 70 - 99 mg/dL    Urea Nitrogen 15 7 - 30 mg/dL    Creatinine 0.61 0.52 - 1.04 mg/dL    GFR Estimate >90 >60 mL/min/1.7m2      Comment:      Non  GFR Calc    GFR Estimate If Black >90 >60 mL/min/1.7m2      Comment:       GFR Calc    Calcium 9.5 8.5 - 10.1 mg/dL   Albumin Random Urine Quantitative with Creat Ratio   Result Value Ref Range    Creatinine Urine 34 mg/dL    Albumin Urine mg/L <5 mg/L    Albumin Urine mg/g Cr Unable to calculate due to low value 0 - 25 mg/g Cr       If you have any questions or concerns, please call the clinic at the number listed above.       Sincerely,        Sean Claudio Jr, MD

## 2018-06-16 NOTE — PROGRESS NOTES
Please call or write patient with the following results:    -Kidney function is normal (Cr, GFR), Sodium is normal, Potassium is normal, Calcium is normal, Glucose is normal (diabetes screening test).   -Microalbumin (urine protein) test is normal.  ADVISE: recheck annually  Continue medications without changes.    Electronically Signed By: Sobeida Iraheta PA-C

## 2018-07-03 ENCOUNTER — HOSPITAL ENCOUNTER (OUTPATIENT)
Facility: CLINIC | Age: 68
Discharge: HOME | End: 2018-07-03
Attending: INTERNAL MEDICINE | Admitting: INTERNAL MEDICINE
Payer: COMMERCIAL

## 2018-07-03 VITALS
RESPIRATION RATE: 15 BRPM | HEART RATE: 95 BPM | WEIGHT: 107 LBS | BODY MASS INDEX: 21.57 KG/M2 | OXYGEN SATURATION: 96 % | HEIGHT: 59 IN | SYSTOLIC BLOOD PRESSURE: 95 MMHG | DIASTOLIC BLOOD PRESSURE: 57 MMHG

## 2018-07-03 LAB — COLONOSCOPY: NORMAL

## 2018-07-03 PROCEDURE — G0105 COLORECTAL SCRN; HI RISK IND: HCPCS | Performed by: INTERNAL MEDICINE

## 2018-07-03 PROCEDURE — 45378 DIAGNOSTIC COLONOSCOPY: CPT | Performed by: INTERNAL MEDICINE

## 2018-07-03 PROCEDURE — 25000128 H RX IP 250 OP 636: Performed by: INTERNAL MEDICINE

## 2018-07-03 PROCEDURE — G0500 MOD SEDAT ENDO SERVICE >5YRS: HCPCS | Performed by: INTERNAL MEDICINE

## 2018-07-03 RX ORDER — ONDANSETRON 2 MG/ML
4 INJECTION INTRAMUSCULAR; INTRAVENOUS EVERY 6 HOURS PRN
Status: DISCONTINUED | OUTPATIENT
Start: 2018-07-03 | End: 2018-07-03 | Stop reason: HOSPADM

## 2018-07-03 RX ORDER — ONDANSETRON 4 MG/1
4 TABLET, ORALLY DISINTEGRATING ORAL EVERY 6 HOURS PRN
Status: DISCONTINUED | OUTPATIENT
Start: 2018-07-03 | End: 2018-07-03 | Stop reason: HOSPADM

## 2018-07-03 RX ORDER — FENTANYL CITRATE 50 UG/ML
INJECTION, SOLUTION INTRAMUSCULAR; INTRAVENOUS PRN
Status: DISCONTINUED | OUTPATIENT
Start: 2018-07-03 | End: 2018-07-03 | Stop reason: HOSPADM

## 2018-07-03 RX ORDER — ONDANSETRON 2 MG/ML
4 INJECTION INTRAMUSCULAR; INTRAVENOUS
Status: DISCONTINUED | OUTPATIENT
Start: 2018-07-03 | End: 2018-07-03 | Stop reason: HOSPADM

## 2018-07-03 RX ORDER — NALOXONE HYDROCHLORIDE 0.4 MG/ML
.1-.4 INJECTION, SOLUTION INTRAMUSCULAR; INTRAVENOUS; SUBCUTANEOUS
Status: DISCONTINUED | OUTPATIENT
Start: 2018-07-03 | End: 2018-07-03 | Stop reason: HOSPADM

## 2018-07-03 RX ORDER — LIDOCAINE 40 MG/G
CREAM TOPICAL
Status: DISCONTINUED | OUTPATIENT
Start: 2018-07-03 | End: 2018-07-03 | Stop reason: HOSPADM

## 2018-07-03 RX ORDER — FLUMAZENIL 0.1 MG/ML
0.2 INJECTION, SOLUTION INTRAVENOUS
Status: DISCONTINUED | OUTPATIENT
Start: 2018-07-03 | End: 2018-07-03 | Stop reason: HOSPADM

## 2018-07-03 NOTE — IP AVS SNAPSHOT
"                  MRN:[id number]                      After Visit Summary   7/3/2018    Frida Iglesias    MRN: [id number]           Thank you!     Thank you for choosing [facility] for your care. Our goal is always to provide you with excellent care. Hearing back from our patients is one way we can continue to improve our services. Please take a few minutes to complete the written survey that you may receive in the mail after you visit. If you would like to speak to someone directly about your visit please contact Patient Relations at [phone]. Thank you!          Patient Information     Date Of Birth          1950        About your hospital stay     You were admitted on:  July 3, 2018 You last received care in the:  [facility]    You were discharged on:  July 3, 2018       Who to Call     For medical emergencies, please call 911.  For non-urgent questions about your medical care, please call your primary care provider or clinic, [phone]  For questions related to your surgery, please call your surgery clinic        Attending Provider     Provider Specialty    Manuel Garcia MD Gastroenterology       Primary Care Provider Office Phone # Fax #    Sobeida Iraheta PA-C [phone]      Further instructions from your care team       The patient has received a copy of the Provation  report the doctor has written and discharge instructions have been discussed with the patient and responsible adult.  All questions were addressed and answered prior to patient discharge.    Pending Results     No orders found from 7/1/2018 to 7/4/2018.            Admission Information     Date & Time Provider Department Dept. Phone    7/3/2018 Manuel Garcia MD [facility] [phone]      Your Vitals Were     Blood Pressure Pulse Respirations Height Weight Pulse Oximetry    83/45 95 10 1.499 m (4' 11\") 48.5 kg (107 lb) 97%    BMI (Body Mass Index)    "                21.61 kg/m2           Care EveryWhere ID     This is your Care EveryWhere ID. This could be used by other organizations to access your [place] medical records  [id number]        Equal Access to Services     MICHAELAKULWINDER OFELIA : Hadii aad ku hadandrealesli Gabbylottie, wadanielda luqsharona, monicata kalouisa zheng, claudia sharma lasorayadavid disla. So [facility] [phone].    ATENCIÓN: Si habla español, tiene a oviedo disposición servicios gratuitos de asistencia lingüística. Llame al [phone].    We comply with applicable federal civil rights laws and [place] laws. We do not discriminate on the basis of race, color, national origin, age, disability, sex, sexual orientation, or gender identity.               Review of your medicines      CONTINUE these medicines which have NOT CHANGED        Dose / Directions    lisinopril 10 MG tablet   Commonly known as:  PRINIVIL/ZESTRIL   Used for:  Hypertension goal BP (blood pressure) < 130/80        Dose:  10 mg   Take 1 tablet (10 mg) by mouth daily   Quantity:  90 tablet   Refills:  3       simvastatin 10 MG tablet   Commonly known as:  ZOCOR   Used for:  Hyperlipidemia LDL goal <130        Take 1 tablet (10 mg) by mouth at bedtime   Quantity:  90 tablet   Refills:  1       triamcinolone 0.1 % cream   Commonly known as:  KENALOG   Used for:  Contact dermatitis due to poison ivy        Apply sparingly to affected area three times daily for 14 days.   Quantity:  30 g   Refills:  0       VIACTIV 500-500-40 MG-UNT-MCG Chew   Used for:  Osteopenia, unspecified location   Generic drug:  calcium-vitamin D-vitamin K        Dose:  2 tablet   Take 2 tablets by mouth daily   Refills:  0                Protect others around you: Learn how to safely use, store and throw away your medicines at www.disposemymeds.org.             Medication List: This is a list of all your medications and when to take them. Check marks below indicate your daily home schedule. Keep this list as a  reference.      Medications           Morning Afternoon Evening Bedtime As Needed    lisinopril 10 MG tablet   Commonly known as:  PRINIVIL/ZESTRIL   Take 1 tablet (10 mg) by mouth daily                                simvastatin 10 MG tablet   Commonly known as:  ZOCOR   Take 1 tablet (10 mg) by mouth at bedtime                                triamcinolone 0.1 % cream   Commonly known as:  KENALOG   Apply sparingly to affected area three times daily for 14 days.                                VIACTIV 500-500-40 MG-UNT-MCG Chew   Take 2 tablets by mouth daily   Generic drug:  calcium-vitamin D-vitamin K

## 2018-07-03 NOTE — LETTER
Alis 15, 2018      Frida Iglesias  [address] [address] [address]        Thank you for choosing [facility]. You are scheduled for the following service.     Date:  7/03/2018 Tuesday             Procedure:  COLONOSCOPY  Doctor:        Dr. Manuel Garcia   Arrival Time:  7:30 AM  *Check in at Emergency/Endoscopy desk*  Procedure Time:  8:00 AM    Location:   [facility]        Endoscopy Department, First Floor (Enter through ER Doors) *        [address] [place], [place] [address]      [phone] or [phone] () to reschedule      MIRALAX -GATORADE  PREP  Colonoscopy is the most accurate test to detect colon polyps and colon cancer; and the only test where polyps can be removed. During this procedure, a doctor examines the lining of your large intestine and rectum through a flexible tube.     Transportation  Arrange for a ride for the day of your procedure with a responsible adult.  A taxi ride is not an option unless you are accompanied by a responsible adult. If you fail to arrange transportation with a responsible adult, your procedure will be cancelled and rescheduled.    Purchase the  following supplies at your local pharmacy:  - 2 (two) bisacodyl tablets: each tablet contains 5 mg.  (Dulcolax  laxative NOT Dulcolax  stool softener)   - 1 (one) 8.3 oz bottle of Polyethylene Glycol (PEG) 3350 Powder   (MiraLAX , Smooth LAX , ClearLAX  or equivalent)  - 64 oz Gatorade    Regular Gatorade, Gatorade G2 , Powerade , Powerade Zero  or Pedialyte  is acceptable. Red colored flavors are not allowed; all other colors (yellow, green, orange, purple and blue) are okay. It is also okay to buy two 2.12 oz packets of powdered Gatorade that can be mixed with water to a total volume of 64 oz of liquid.  - 1 (one) 10 oz bottle of Magnesium Citrate (Red colored flavors are not allowed)  It is also okay for you to use a 0.5 oz package of powdered magnesium  citrate (17 g) mixed with 10 oz of water.    PREPARATION FOR COLONOSCOPY    7 days before:    Discontinue fiber supplements and medications containing iron. This includes Metamucil  and Fibercon ; and multivitamins with iron.  3 days before:    Begin a low-fiber diet. A low-fiber diet helps making the cleanout more effective.     Examples of a low-fiber diet include (but are not limited to): white bread, white rice, pasta, crackers, fish, chicken, eggs, ground beef, creamy peanut butter, cooked/steamed/boiled vegetables, canned fruit, bananas, melons, milk, plain yogurt cheese, salad dressing and other condiments.     The following are not allowed on a low-fiber diet: seeds, nuts, popcorn, bran, whole wheat, corn, quinoa, raw fruits and vegetables, berries and dried fruit, beans and lentils.    For additional details on low-fiber diet, please refer to the table on the last page.  2 days before:    Continue the low-fiber diet.     Drink at least 8 glasses of water throughout the day.     Stop eating solid foods at 11:45 pm.  1 day before:    In the morning: begin a clear liquid diet (liquids you can see through).     Examples of a clear liquid diet include: water, clear broth or bouillon, Gatorade, Pedialyte or Powerade, carbonated and non-carbonated soft drinks (Sprite , 7-Up , ginger ale), strained fruit juices without pulp (apple, white grape, white cranberry), Jell-O  and popsicles.     The following are not allowed on a clear liquid diet: red liquids, alcoholic beverages, dairy products (milk, creamer, and yogurt), protein shakes, creamy broths, juice with pulp and chewing tobacco.    At noon: take 2 (two) bisacodyl tablets     At 4 (and no later than 6pm): start drinking the Miralax-Gatorade preparation (8.3 oz of Miralax mixed with 64 oz of Gatorade in a large pitcher). Drink 1(one) 8 oz glass every 15 minutes thereafter, until the mixture is gone.    COLON CLEANSING TIPS: drink adequate amounts of fluids  before and after your colon cleansing to prevent dehydration. Stay near a toilet because you will have diarrhea. Even if you are sitting on the toilet, continue to drink the cleansing solution every 15 minutes. If you feel nauseous or vomit, rinse your mouth with water, take a 15 to 30-minute-break and then continue drinking the solution. You will be uncomfortable until the stool has flushed from your colon (in about 2 to 4 hours). You may feel chilled.      Day of your procedure  You may take all of your morning medications including blood pressure medications, blood thinners (if you have not been instructed to stop these by our office), methadone, anti-seizure medications with sips of water 3 hours prior to your procedure or earlier. Do not take insulin or vitamins prior to your procedure. Continue the clear liquid diet.   4 hours prior: drink 10 oz of magnesium citrate. It may be easier to drink it with a straw.    STOP consuming all liquids after that.     Do not take anything by mouth during this time.     Allow extra time to travel to your procedure as you may need to stop and use a restroom along the way.  You are ready for the procedure, if you followed all instructions and your stool is no longer formed, but clear or yellow liquid. If you are unsure whether your colon is clean, please call our office at [phone] before you leave for your appointment.  Bring the following to your procedure:  - Insurance Card/Photo ID.   - List of current medications including over-the-counter medications and supplements.   - Your rescue inhaler if you currently use one to control asthma.      Canceling or rescheduling your appointment:   If you must cancel or reschedule your appointment, please call [phone] as soon as possible.      COLONOSCOPY PRE-PROCEDURE CHECKLIST  If you have diabetes, ask your regular doctor for diet and medication restrictions.  If you take an anticoagulant or anti-platelet medication (such  as Coumadin , Lovenox , Pradaxa , Xarelto , Eliquis , etc.), please call your primary doctor for advice on holding this medication.  If you take aspirin you may continue to do so.  If you are or may be pregnant, please discuss the risks and benefits of this procedure with your doctor.          What happens during a colonoscopy?    Plan to spend up to two hours, starting at registration time, at the endoscopy center the day of your procedure. The colonoscopy takes an average of 15 to 30 minutes. Recovery time is about 30 minutes.    Before the exam:    You will change into a gown.    Your medical history and medication list will be reviewed with you, unless that has been done over the phone prior to the procedure.     A nurse will insert an intravenous (IV) line into your hand or arm.    The doctor will meet with you and will give you a consent form to sign.    During the exam:     Medicine will be given through the IV line to help you relax.     Your heart rate and oxygen levels will be monitored. If your blood pressure is low, you may be given fluids through the IV line.     The doctor will insert a flexible hollow tube, called a colonoscope, into your rectum. The scope will be advanced slowly through the large intestine (colon).    You may have a feeling of fullness or pressure.     If an abnormal tissue or a polyp is found, the doctor may remove it through the endoscope for closer examination, or biopsy. Tissue removal is painless    After the exam:           Any tissue samples removed during the exam will be sent to a lab for evaluation. It may take 5-7 working days for you to be notified of the results.     A nurse will provide you with complete discharge instructions before you leave the endoscopy center. Be sure to ask the nurse for specific instructions if you take blood thinners such as Aspirin, Coumadin or Plavix.     The doctor will prepare a full report for you and for the physician who referred you for  the procedure.     Your doctor will talk with you about the initial results of your exam.      Medication given during the exam will prohibit you from driving for the rest of the day.     Following the exam, you may resume your normal diet. Your first meal should be light, no greasy foods. Avoid alcohol until the next day.     You may resume your regular activities the day after the procedure.     LOW-FIBER DIET    Foods RECOMMENDED Foods to AVOID   Breads, Cereal, Rice and Pasta:   White bread, rolls, biscuits, croissant and jacquie toast.   Waffles, Algerian toast and pancakes.   White rice, noodles, pasta, macaroni and peeled cooked potatoes.   Plain crackers and saltines.   Cooked cereals: farina, cream of rice.   Cold cereals: Puffed Rice , Rice Krispies , Corn Flakes  and Special K    Breads, Cereal, Rice and Pasta:   Breads or rolls with nuts, seeds or fruit.   Whole wheat, pumpernickel, rye breads and cornbread.   Potatoes with skin, brown or wild rice, and kasha (buckwheat).     Vegetables:   Tender cooked and canned vegetables without seeds: carrots, asparagus tips, green or wax beans, pumpkin, spinach, lima beans. Vegetables:   Raw or steamed vegetables.   Vegetables with seeds.   Sauerkraut.   Winter squash, peas, broccoli, Brussel sprouts, cabbage, onions, cauliflower, baked beans, peas and corn.   Fruits:   Strained fruit juice.   Canned fruit, except pineapple.   Ripe bananas and melon. Fruits:   Prunes and prune juice.   Raw fruits.   Dried fruits: figs, dates and raisins.   Milk/Dairy:   Milk: plain or flavored.   Yogurt, custard and ice cream.   Cheese and cottage cheese Milk/Dairy:     Meat and other proteins:   ground, well-cooked tender beef, lamb, ham, veal, pork, fish, poultry and organ meats.   Eggs.   Peanut butter without nuts. Meat and other proteins:   Tough, fibrous meats with gristle.   Dry beans, peas and lentils.   Peanut butter with nuts.   Tofu.   Fats, Snack, Sweets, Condiments and  Beverages:   Margarine, butter, oils, mayonnaise, sour cream and salad dressing, plain gravy.   Sugar, hard candy, clear jelly, honey and syrup.   Spices, cooked herbs, bouillon, broth and soups made with allowed vegetable, ketchup and mustard.   Coffee, tea and carbonated drinks.   Plain cakes, cookies and pretzels.   Gelatin, plain puddings, custard, ice cream, sherbet and popsicles. Fats, Snack, Sweets, Condiments and Beverages:   Nuts, seeds and coconut.   Jam, marmalade and preserves.   Pickles, olives, relish and horseradish.   All desserts containing nuts, seeds, dried fruit and coconut; or made from whole grains or bran.   Candy made with nuts or seeds.   Popcorn.                     DIRECTIONS TO THE ENDOSCOPY DEPARTMENT     From the north ([facility])  Take [address] South, exit on [address] [address]. Get into the left hand agustín, turn left (east), go one-half mile to [address] and turn left. Go north to the first stoplight, take a right on [address] and follow it to the Emergency entrance.    From the south ([facility])  Take 35N to the [address]E split and exit on [address] [address]. On [address] , turn left (west) to [address]. Turn right (north) on [address]. Go north to the first stoplight, take a right on [address] and follow it to the Emergency entrance.    From the east via 35E ([facility])  Take [address] south to [address] [address] exit. Turn right on [address] . Go west to [address]. Turn right (north) on [address]. Go to the first stoplight, take a right and follow on [address] to the Emergency entrance.    From the east via [address] ([facility])  Take [address] to [address]. Turn left (south) on [address] to [address]. Turn left (east) on [address] and follow it to the Emergency entrance.    From the west via [address] ([place], [place])  Take [address] east to [address]. Turn right (south)  on [address] to [address]. Turn left (east) on [address] and follow it to the Emergency entrance.

## 2018-07-03 NOTE — H&P
Pre-Endoscopy History and Physical     Frida Iglesias MRN# [id number]   YOB: 1950 Age: 67 year old     Date of Procedure: 7/3/2018  Primary care provider: Sobeida Iraheta  Type of Endoscopy: Colonoscopy with possible biopsy, possible polypectomy  Reason for Procedure: screen  Type of Anesthesia Anticipated: Conscious Sedation    HPI:    Frida is a 67 year old female who will be undergoing the above procedure.      A history and physical has been performed. The patient's medications and allergies have been reviewed. The risks and benefits of the procedure and the sedation options and risks were discussed with the patient.  All questions were answered and informed consent was obtained.      She denies a personal or family history of anesthesia complications or bleeding disorders.     Patient Active Problem List   Diagnosis     Advanced directives, counseling/discussion     CARDIOVASCULAR SCREENING; LDL GOAL LESS THAN 160     History of colonic polyps     Osteopenia - repeat in 5 years     Post-menopause     Hyperlipidemia LDL goal <130     Hypertension goal BP (blood pressure) < 130/80     Family history of colon cancer in father - age 82        Past Medical History:   Diagnosis Date     High cholesterol      Hypertension         History reviewed. No pertinent surgical history.    Social History   Substance Use Topics     Smoking status: Former Smoker     Packs/day: 1.50     Years: 10.00     Types: Cigarettes     Quit date: 8/5/1991     Smokeless tobacco: Never Used     Alcohol use No       Family History   Problem Relation Age of Onset     Hypertension Father      Colon Cancer Father      Breast Cancer Paternal Grandmother      Diabetes No family hx of      Coronary Artery Disease No family hx of      Hyperlipidemia No family hx of      Cerebrovascular Disease No family hx of        Prior to Admission medications    Medication Sig Start Date End Date Taking? Authorizing Provider  "  calcium-vitamin D-vitamin K (VIACTIV) 500-500-40 MG-UNT-MCG CHEW Take 2 tablets by mouth daily 8/9/17  Yes Sobeida Iraheta PA-C   lisinopril (PRINIVIL/ZESTRIL) 10 MG tablet Take 1 tablet (10 mg) by mouth daily 6/15/18  Yes Sean Claudio Jr., MD   simvastatin (ZOCOR) 10 MG tablet Take 1 tablet (10 mg) by mouth at bedtime 4/2/18  Yes Sobeida Iraheta PA-C   triamcinolone (KENALOG) 0.1 % cream Apply sparingly to affected area three times daily for 14 days. 6/15/18   Sean Claudio Jr., MD       No Known Allergies     REVIEW OF SYSTEMS:   5 point ROS negative except as noted above in HPI, including Gen., Resp., CV, GI &  system review.    PHYSICAL EXAM:   Ht 1.499 m (4' 11\")  Wt 48.5 kg (107 lb)  BMI 21.61 kg/m2 Estimated body mass index is 21.61 kg/(m^2) as calculated from the following:    Height as of this encounter: 1.499 m (4' 11\").    Weight as of this encounter: 48.5 kg (107 lb).   GENERAL APPEARANCE: alert, and oriented  MENTAL STATUS: alert  AIRWAY EXAM: Mallampatti Class I (visualization of the soft palate, fauces, uvula, anterior and posterior pillars)  RESP: lungs clear to auscultation - no rales, rhonchi or wheezes  CV: regular rates and rhythm  DIAGNOSTICS:    Not indicated    IMPRESSION   ASA Class 2 - Mild systemic disease    PLAN:   Plan for Colonoscopy with possible biopsy, possible polypectomy. We discussed the risks, benefits and alternatives and the patient wished to proceed.    The above has been forwarded to the consulting provider.      Signed Electronically by: Manuel Garcia  July 3, 2018          "

## 2018-07-03 NOTE — OR NURSING
Received pt from previous recovery Rn [facility] ,blood pressure low pt feels weak and dizzy ,Md visited pt iv fluid bolus started ,new iv site started

## 2018-07-03 NOTE — OR NURSING
Pt discharged home in stable condition ,blood pressure prior to discharge while pt is standing ,pt denies dizziness ,and said she feels better

## 2018-09-02 ENCOUNTER — OFFICE VISIT (OUTPATIENT)
Dept: URGENT CARE | Facility: URGENT CARE | Age: 68
End: 2018-09-02
Payer: COMMERCIAL

## 2018-09-02 VITALS
SYSTOLIC BLOOD PRESSURE: 116 MMHG | TEMPERATURE: 97.8 F | DIASTOLIC BLOOD PRESSURE: 62 MMHG | WEIGHT: 111.9 LBS | HEART RATE: 82 BPM | OXYGEN SATURATION: 98 % | BODY MASS INDEX: 22.6 KG/M2

## 2018-09-02 DIAGNOSIS — L25.9 CONTACT DERMATITIS, UNSPECIFIED CONTACT DERMATITIS TYPE, UNSPECIFIED TRIGGER: Primary | ICD-10-CM

## 2018-09-02 DIAGNOSIS — E78.5 HYPERLIPIDEMIA LDL GOAL <130: ICD-10-CM

## 2018-09-02 PROCEDURE — 99213 OFFICE O/P EST LOW 20 MIN: CPT | Performed by: FAMILY MEDICINE

## 2018-09-02 RX ORDER — SIMVASTATIN 10 MG
TABLET ORAL
Qty: 90 TABLET | Refills: 0 | Status: SHIPPED | OUTPATIENT
Start: 2018-09-02 | End: 2018-12-19

## 2018-09-02 RX ORDER — TRIAMCINOLONE ACETONIDE 1 MG/G
OINTMENT TOPICAL
Qty: 30 G | Refills: 0 | Status: SHIPPED | OUTPATIENT
Start: 2018-09-02 | End: 2019-06-11

## 2018-09-02 NOTE — MR AVS SNAPSHOT
After Visit Summary   9/2/2018    Frida Iglesias    MRN: [id number]           Patient Information     Date Of Birth          1950        Visit Information        Provider Department      9/2/2018 8:05 AM Kassie Anderson,  [facility]        Today's Diagnoses     Bullous rash    -  1      Care Instructions    Apply the steroid cream 3 times a day for the next 10-14 days to the rash.   Do not use more than a total of 14 days.    If you have any itching:  Consider using calamine lotion; antihistamines (benadryl is sedating and may be best for home/night time, claritin and zyrtec are non sedating); ; and, avoid hot water in your baths/showers.    If any spread of the large blisters or rash, not improving as expect, or you start to feel ill, recheck with your primary provider.    Leave the blisters alone.  If they burst --  Antibiotic ointment and keep covered and clean.  Return to care if any sign of bacterial infection develops.            Follow-ups after your visit        Who to contact     If you have questions or need follow up information about today's clinic visit or your schedule please contact [facility] directly at [phone].  Normal or non-critical lab and imaging results will be communicated to you by MyChart, letter or phone within 4 business days after the clinic has received the results. If you do not hear from us within 7 days, please contact the clinic through MyChart or phone. If you have a critical or abnormal lab result, we will notify you by phone as soon as possible.  Submit refill requests through [employer] or call your pharmacy and they will forward the refill request to us. Please allow 3 business days for your refill to be completed.          Additional Information About Your Visit        Care EveryWhere ID     This is your Care EveryWhere ID. This could be used by other organizations to access your [facility]  records  [id number]        Your Vitals Were     Pulse Temperature Pulse Oximetry BMI (Body Mass Index)          82 97.8  F (36.6  C) (Oral) 98% 22.6 kg/m2         Blood Pressure from Last 3 Encounters:   09/02/18 116/62   07/03/18 95/57   06/15/18 128/75    Weight from Last 3 Encounters:   09/02/18 111 lb 14.4 oz (50.8 kg)   07/03/18 107 lb (48.5 kg)   06/15/18 107 lb 11.2 oz (48.9 kg)              Today, you had the following     No orders found for display         Today's Medication Changes          These changes are accurate as of 9/2/18  8:24 AM.  If you have any questions, ask your nurse or doctor.               These medicines have changed or have updated prescriptions.        Dose/Directions    * triamcinolone 0.1 % cream   Commonly known as:  KENALOG   This may have changed:  Another medication with the same name was added. Make sure you understand how and when to take each.   Used for:  Contact dermatitis due to poison ivy   Changed by:  Kassie Anderson, DO        Apply sparingly to affected area three times daily for 14 days.   Quantity:  30 g   Refills:  0       * triamcinolone 0.1 % ointment   Commonly known as:  KENALOG   This may have changed:  You were already taking a medication with the same name, and this prescription was added. Make sure you understand how and when to take each.   Used for:  Bullous rash   Changed by:  Kassie Anderson, DO        Apply sparingly to affected area three times daily for 14 days.   Quantity:  30 g   Refills:  0       * Notice:  This list has 2 medication(s) that are the same as other medications prescribed for you. Read the directions carefully, and ask your doctor or other care provider to review them with you.         Where to get your medicines      Some of these will need a paper prescription and others can be bought over the counter.  Ask your nurse if you have questions.     Bring a paper prescription for each of these medications     triamcinolone  0.1 % ointment                Primary Care Provider Office Phone # Fax #    Sobeida Motta PA-C [phone] [address]  [place] [address]        Equal Access to Services     MICHAELAKULWINDER OFELIA : Hadii aad ku hadasho Soomaali, waaxda luqadaha, qaybta kaalmada adeegyada, waxbertha idiin hayaan adeeg zachary lasorayan . So [facility] [phone].    ATENCIÓN: Si habla español, tiene a oviedo disposición servicios gratuitos de asistencia lingüística. Llame al [phone].    We comply with applicable federal civil rights laws and [place] laws. We do not discriminate on the basis of race, color, national origin, age, disability, sex, sexual orientation, or gender identity.            Thank you!     Thank you for choosing [facility]  for your care. Our goal is always to provide you with excellent care. Hearing back from our patients is one way we can continue to improve our services. Please take a few minutes to complete the written survey that you may receive in the mail after your visit with us. Thank you!             Your Updated Medication List - Protect others around you: Learn how to safely use, store and throw away your medicines at www.disposemymeds.org.          This list is accurate as of 9/2/18  8:24 AM.  Always use your most recent med list.                   Brand Name Dispense Instructions for use Diagnosis    lisinopril 10 MG tablet    PRINIVIL/ZESTRIL    90 tablet    Take 1 tablet (10 mg) by mouth daily    Hypertension goal BP (blood pressure) < 130/80       simvastatin 10 MG tablet    ZOCOR    90 tablet    Take 1 tablet (10 mg) by mouth at bedtime    Hyperlipidemia LDL goal <130       * triamcinolone 0.1 % cream    KENALOG    30 g    Apply sparingly to affected area three times daily for 14 days.    Contact dermatitis due to poison ivy       * triamcinolone 0.1 % ointment    KENALOG    30 g    Apply sparingly to affected area three times daily for 14 days.    Bullous  rash       VIACTIV 500-500-40 MG-UNT-MCG Chew   Generic drug:  calcium-vitamin D-vitamin K      Take 2 tablets by mouth daily    Osteopenia, unspecified location       * Notice:  This list has 2 medication(s) that are the same as other medications prescribed for you. Read the directions carefully, and ask your doctor or other care provider to review them with you.

## 2018-09-02 NOTE — TELEPHONE ENCOUNTER
"Requested Prescriptions   Pending Prescriptions Disp Refills     simvastatin (ZOCOR) 10 MG tablet [Pharmacy Med Name: Simvastatin Oral Tablet 10 MG] 90 tablet 0     Sig: Take 1 tablet (10 mg) by mouth at bedtime    Statins Protocol Passed    9/2/2018  9:18 AM       Passed - LDL on file in past 12 months    Recent Labs   Lab Test  03/20/18   1005   LDL  94            Passed - No abnormal creatine kinase in past 12 months    No lab results found.            Passed - Recent (12 mo) or future (30 days) visit within the authorizing provider's specialty    Patient had office visit in the last 12 months or has a visit in the next 30 days with authorizing provider or within the authorizing provider's specialty.  See \"Patient Info\" tab in inbasket, or \"Choose Columns\" in Meds & Orders section of the refill encounter.           Passed - Patient is age 18 or older       Passed - No active pregnancy on record       Passed - No positive pregnancy test in past 12 months        LOV 6/15/2018    Prescription approved per [facility] Refill Protocol.  Millicent Baron RN  [place] [facility]    "

## 2018-09-02 NOTE — PROGRESS NOTES
SUBJECTIVE:   Frida Iglesias is a 68 year old female presenting with a chief complaint of rash.   She reports sensitivity to poison ivy and other plant oils and frequent rashes from these.  [place] to [place], recalls crossing her ankles.  Later that day a red, mildly itchy, dry feeling rash developed on both inner ankles.  She applied kenalog that she had at home from a previous rash and noticed later that some large blisters had developed on the left inner ankle rash.  None have opened or drained.  Not feeling ill.  No further spread away from the ankles.   Has a small amount of the triamcinolone left at home.     ROS:  5 point review of symptoms negative other than positives stated above.    OBJECTIVE  /62  Pulse 82  Temp 97.8  F (36.6  C) (Oral)  Wt 111 lb 14.4 oz (50.8 kg)  SpO2 98%  BMI 22.6 kg/m2  GENERAL:  Awake, alert and interactive. No acute distress.  SKIN:  Red, dry, macular rash to both inner ankles.  No discrete lesions, just redness to the area with irregular border.  Not raised.  Localized to both medial ankles.  On left ankle, in middle of this patch of rash are about 7 or 8 small to mid sized blisters.      ASSESSMENT/PLAN    ICD-10-CM    1. Contact dermatitis, unspecified contact dermatitis type, unspecified trigger L25.9 triamcinolone (KENALOG) 0.1 % ointment     Suspect she came in contact [place] something [place].  Clean the shoes, wash the clothing she had on, etc.  Steroid cream, symptomatic cares for the itch.  Anticipate resolving over next couple of weeks.     Patient Instructions   Apply the steroid cream 3 times a day for the next 10-14 days to the rash.   Do not use more than a total of 14 days.    If you have any itching:  Consider using calamine lotion; antihistamines (benadryl is sedating and may be best for home/night time, claritin and zyrtec are non sedating); ; and, avoid hot water in your baths/showers.    If any spread of the  large blisters or rash, not improving as expect, or you start to feel ill, recheck with your primary provider.    Leave the blisters alone.  If they burst --  Antibiotic ointment and keep covered and clean.  Return to care if any sign of bacterial infection develops.

## 2018-09-02 NOTE — PATIENT INSTRUCTIONS
Apply the steroid cream 3 times a day for the next 10-14 days to the rash.   Do not use more than a total of 14 days.    If you have any itching:  Consider using calamine lotion; antihistamines (benadryl is sedating and may be best for home/night time, claritin and zyrtec are non sedating); ; and, avoid hot water in your baths/showers.    If any spread of the large blisters or rash, not improving as expect, or you start to feel ill, recheck with your primary provider.    Leave the blisters alone.  If they burst --  Antibiotic ointment and keep covered and clean.  Return to care if any sign of bacterial infection develops.

## 2018-12-19 DIAGNOSIS — E78.5 HYPERLIPIDEMIA LDL GOAL <130: ICD-10-CM

## 2018-12-19 RX ORDER — SIMVASTATIN 10 MG
TABLET ORAL
Qty: 90 TABLET | Refills: 1 | Status: SHIPPED | OUTPATIENT
Start: 2018-12-19 | End: 2019-06-11

## 2018-12-19 NOTE — TELEPHONE ENCOUNTER
Routing refill request to provider for review/approval because:  Please review plan for continuing of medication.     Kiki Monzon R.N.

## 2018-12-19 NOTE — TELEPHONE ENCOUNTER
Yes, ok to give 6 month script until physical due again. Then all her labs/physical will be on same track again. Please notify.  Electronically Signed By: Sobeida Motta PA-C

## 2018-12-19 NOTE — TELEPHONE ENCOUNTER
Reason for Call:  Medication or medication refill:    Do you use a [facility]?  Name of the pharmacy and phone number for the current request:  [facility] - [phone]    Name of the medication requested: Simvastatin    Other request: Pt calling as she sees on her bottle she does not have a refill of simvastatin and wanted to know if she needed to be seen to get a refill.  Per her note form her px in June she was to get a 1 year supply after her last labs were done so based on that note she should not need to be seen until June 2019.  Can we refill until she is due for her physical in June 2019?  Pt would like a call either way.    Can we leave a detailed message on this number? YES    Phone number patient can be reached at: Home number on file [phone] (home)    Best Time:     Call taken on 12/19/2018 at 8:52 AM by Marilyn Moon

## 2018-12-19 NOTE — TELEPHONE ENCOUNTER
"Requested Prescriptions   Pending Prescriptions Disp Refills     simvastatin (ZOCOR) 10 MG tablet  Last Written Prescription Date:  9/2/18  Last Fill Quantity: 90,  # refills: 0   Last office visit: 6/15/2018 with prescribing provider:  6/15/18   Future Office Visit:     90 tablet 0    Statins Protocol Passed - 12/19/2018  8:54 AM       Passed - LDL on file in past 12 months    Recent Labs   Lab Test 03/20/18  1005   LDL 94            Passed - No abnormal creatine kinase in past 12 months    No lab results found.            Passed - Recent (12 mo) or future (30 days) visit within the authorizing provider's specialty    Patient had office visit in the last 12 months or has a visit in the next 30 days with authorizing provider or within the authorizing provider's specialty.  See \"Patient Info\" tab in inbasket, or \"Choose Columns\" in Meds & Orders section of the refill encounter.             Passed - Patient is age 18 or older       Passed - No active pregnancy on record       Passed - No positive pregnancy test in past 12 months          "

## 2018-12-20 NOTE — TELEPHONE ENCOUNTER
Please call patient and advise that refills were approved for additional 6 months. Thank you.  Luzmaria Hilario RN, BSN  [facility]

## 2019-05-29 DIAGNOSIS — I10 HYPERTENSION GOAL BP (BLOOD PRESSURE) < 130/80: ICD-10-CM

## 2019-05-29 NOTE — TELEPHONE ENCOUNTER
"Requested Prescriptions   Pending Prescriptions Disp Refills     lisinopril (PRINIVIL/ZESTRIL) 10 MG tablet [Pharmacy Med Name: Lisinopril Oral Tablet 10 MG]  Last Written Prescription Date:  6/15/2018  Last Fill Quantity: 90 tablet,  # refills: 3   Last office visit: 6/15/2018 with prescribing provider:  González     Future Office Visit:       90 tablet 2     Sig: Take 1 tablet (10 mg) by mouth daily       ACE Inhibitors (Including Combos) Protocol Passed - 5/29/2019  7:00 AM        Passed - Blood pressure under 140/90 in past 12 months     BP Readings from Last 3 Encounters:   09/02/18 116/62   07/03/18 95/57   06/15/18 128/75             Passed - Recent (12 mo) or future (30 days) visit within the authorizing provider's specialty     Patient had office visit in the last 12 months or has a visit in the next 30 days with authorizing provider or within the authorizing provider's specialty.  See \"Patient Info\" tab in inbasket, or \"Choose Columns\" in Meds & Orders section of the refill encounter.              Passed - Medication is active on med list        Passed - Patient is age 18 or older        Passed - No active pregnancy on record        Passed - Normal serum creatinine on file in past 12 months     Recent Labs   Lab Test 06/15/18  0909   CR 0.61             Passed - Normal serum potassium on file in past 12 months     Recent Labs   Lab Test 06/15/18  0909   POTASSIUM 3.9             Passed - No positive pregnancy test within past 12 months        "

## 2019-05-30 RX ORDER — LISINOPRIL 10 MG/1
10 TABLET ORAL DAILY
Qty: 30 TABLET | Refills: 0 | Status: SHIPPED | OUTPATIENT
Start: 2019-05-30 | End: 2019-06-11

## 2019-05-30 NOTE — TELEPHONE ENCOUNTER
Medication is being filled for 1 time refill only due to:  Patient needs to be seen because it has been more than one year since last visit. as of 6/15/19.  AWILDA AlonzoN, RN  [facility]

## 2019-06-11 ENCOUNTER — OFFICE VISIT (OUTPATIENT)
Dept: FAMILY MEDICINE | Facility: CLINIC | Age: 69
End: 2019-06-11
Payer: COMMERCIAL

## 2019-06-11 ENCOUNTER — TELEPHONE (OUTPATIENT)
Dept: FAMILY MEDICINE | Facility: CLINIC | Age: 69
End: 2019-06-11

## 2019-06-11 VITALS
HEART RATE: 116 BPM | DIASTOLIC BLOOD PRESSURE: 60 MMHG | BODY MASS INDEX: 22.78 KG/M2 | OXYGEN SATURATION: 97 % | SYSTOLIC BLOOD PRESSURE: 130 MMHG | HEIGHT: 59 IN | WEIGHT: 113 LBS | TEMPERATURE: 98.2 F

## 2019-06-11 DIAGNOSIS — Z12.31 ENCOUNTER FOR SCREENING MAMMOGRAM FOR BREAST CANCER: ICD-10-CM

## 2019-06-11 DIAGNOSIS — M85.80 OSTEOPENIA, UNSPECIFIED LOCATION: ICD-10-CM

## 2019-06-11 DIAGNOSIS — Z80.0 FAMILY HISTORY OF COLON CANCER IN FATHER: ICD-10-CM

## 2019-06-11 DIAGNOSIS — L23.7 CONTACT DERMATITIS DUE TO POISON IVY: ICD-10-CM

## 2019-06-11 DIAGNOSIS — I10 HYPERTENSION GOAL BP (BLOOD PRESSURE) < 130/80: ICD-10-CM

## 2019-06-11 DIAGNOSIS — E78.5 HYPERLIPIDEMIA LDL GOAL <130: ICD-10-CM

## 2019-06-11 DIAGNOSIS — Z00.00 HEALTHY ADULT ON ROUTINE PHYSICAL EXAMINATION: Primary | ICD-10-CM

## 2019-06-11 PROCEDURE — G0439 PPPS, SUBSEQ VISIT: HCPCS | Performed by: PHYSICIAN ASSISTANT

## 2019-06-11 PROCEDURE — 82043 UR ALBUMIN QUANTITATIVE: CPT | Performed by: PHYSICIAN ASSISTANT

## 2019-06-11 PROCEDURE — 36415 COLL VENOUS BLD VENIPUNCTURE: CPT | Performed by: PHYSICIAN ASSISTANT

## 2019-06-11 PROCEDURE — 99207 C PAF COMPLETED  NO CHARGE: CPT | Performed by: PHYSICIAN ASSISTANT

## 2019-06-11 PROCEDURE — 80053 COMPREHEN METABOLIC PANEL: CPT | Performed by: PHYSICIAN ASSISTANT

## 2019-06-11 PROCEDURE — 80061 LIPID PANEL: CPT | Performed by: PHYSICIAN ASSISTANT

## 2019-06-11 RX ORDER — TRIAMCINOLONE ACETONIDE 1 MG/G
CREAM TOPICAL
Qty: 30 G | Refills: 0 | Status: SHIPPED | OUTPATIENT
Start: 2019-06-11 | End: 2020-07-17

## 2019-06-11 RX ORDER — SIMVASTATIN 10 MG
TABLET ORAL
Qty: 90 TABLET | Refills: 3 | Status: SHIPPED | OUTPATIENT
Start: 2019-06-11 | End: 2020-05-14

## 2019-06-11 RX ORDER — LISINOPRIL 10 MG/1
10 TABLET ORAL DAILY
Qty: 90 TABLET | Refills: 3 | Status: SHIPPED | OUTPATIENT
Start: 2019-06-11 | End: 2020-07-16

## 2019-06-11 ASSESSMENT — MIFFLIN-ST. JEOR: SCORE: 948.19

## 2019-06-11 NOTE — TELEPHONE ENCOUNTER
Called [facility] pharmacy and spoke to Nadege, pharmacist and advised of location medication should be applied.    AWILDA BarkerN, RN  [facility]

## 2019-06-11 NOTE — PATIENT INSTRUCTIONS
Patient Education   Personalized Prevention Plan  You are due for the preventive services outlined below.  Your care team is available to assist you in scheduling these services.  If you have already completed any of these items, please share that information with your care team to update in your medical record.  Health Maintenance Due   Topic Date Due     PHQ-2  01/01/2019     Annual Wellness Visit  06/05/2019     Mammogram  06/13/2019     Basic Metabolic Panel  06/15/2019

## 2019-06-11 NOTE — TELEPHONE ENCOUNTER
"Nadege calling from [facility] pharmacy regarding the triamcinolone Rx.  Per Nadege, insurance requires specific \"area\" to be applied.  Please indicate location on patient and route back to RN to call pharmacy    Routing to provider for review and advise as appropriate.    [facility] can be reached at: [phone]    AWILDA BarkerN, RN  [facility]      "

## 2019-06-11 NOTE — LETTER
June 17, 2019      Frida Iglesias  [address] [address] [address]        Dear ,    We are writing to inform you of your test results.    -Cholesterol levels are at your goal levels and even look a bit better than last year!  ADVISE: continuing your medication, a regular exercise program with at least 150 minutes of aerobic exercise per week, and eating a low saturated fat/low carbohydrate diet.  Also, you should recheck this fasting cholesterol panel in 12 months.  -Liver and gallbladder tests are normal (ALT,AST, Alk phos, bilirubin), kidney function is normal (Cr, GFR), sodium is normal, potassium is normal, calcium is normal, glucose is normal.  -Microalbumin (urine protein) test is normal.  ADVISE: rechecking this annually.    Resulted Orders   Lipid panel reflex to direct LDL Fasting   Result Value Ref Range    Cholesterol 163 <200 mg/dL    Triglycerides 79 <150 mg/dL      Comment:      Fasting specimen    HDL Cholesterol 58 >49 mg/dL    LDL Cholesterol Calculated 89 <100 mg/dL      Comment:      Desirable:       <100 mg/dl    Non HDL Cholesterol 105 <130 mg/dL   Comprehensive metabolic panel   Result Value Ref Range    Sodium 141 133 - 144 mmol/L    Potassium 4.0 3.4 - 5.3 mmol/L    Chloride 105 94 - 109 mmol/L    Carbon Dioxide 27 20 - 32 mmol/L    Anion Gap 9 3 - 14 mmol/L    Glucose 95 70 - 99 mg/dL      Comment:      Fasting specimen    Urea Nitrogen 17 7 - 30 mg/dL    Creatinine 0.64 0.52 - 1.04 mg/dL    GFR Estimate >90 >60 mL/min/[1.73_m2]      Comment:      Non  GFR Calc  Starting 12/18/2018, serum creatinine based estimated GFR (eGFR) will be   calculated using the Chronic Kidney Disease Epidemiology Collaboration   (CKD-EPI) equation.      GFR Estimate If Black >90 >60 mL/min/[1.73_m2]      Comment:       GFR Calc  Starting 12/18/2018, serum creatinine based estimated GFR (eGFR) will be   calculated using the Chronic Kidney Disease Epidemiology  Collaboration   (CKD-EPI) equation.      Calcium 9.1 8.5 - 10.1 mg/dL    Bilirubin Total 0.5 0.2 - 1.3 mg/dL    Albumin 3.9 3.4 - 5.0 g/dL    Protein Total 7.4 6.8 - 8.8 g/dL    Alkaline Phosphatase 82 40 - 150 U/L    ALT 23 0 - 50 U/L    AST 18 0 - 45 U/L   Albumin Random Urine Quantitative with Creat Ratio   Result Value Ref Range    Creatinine Urine 56 mg/dL    Albumin Urine mg/L <5 mg/L    Albumin Urine mg/g Cr Unable to calculate due to low value 0 - 25 mg/g Cr       If you have any questions or concerns, please call the clinic at the number listed above.       Sincerely,        Sobeida Motta PA-C

## 2019-06-11 NOTE — PROGRESS NOTES
"  SUBJECTIVE:   Frida Iglesias is a 68 year old female who presents for Preventive Visit.    Are you in the first 12 months of your Medicare Part B coverage?  No    Physical Health:    In general, how would you rate your overall physical health? good    Outside of work, how many days during the week do you exercise? 3-4 days a week    Outside of work, approximately how many minutes a day do you exercise?45-60 minutes    If you drink alcohol do you typically have >3 drinks per day or >7 drinks per week? No    Do you usually eat at least 4 servings of fruit and vegetables a day, include whole grains & fiber and avoid regularly eating high fat or \"junk\" foods? Yes    Do you have any problems taking medications regularly?  No    Do you have any side effects from medications? not applicable    Needs assistance for the following daily activities: no assistance needed    Which of the following safety concerns are present in your home?  none identified     Hearing impairment: No    In the past 6 months, have you been bothered by leaking of urine? no    Mental Health:    In general, how would you rate your overall mental or emotional health? good  PHQ-2 Score:      Do you feel safe in your environment? Yes    Do you have a Health Care Directive? Yes: Advance Directive has been received and scanned.    Additional concerns to address?  No    Fall risk:  Fallen 2 or more times in the past year?: No  Any fall with injury in the past year?: No    Cognitive Screenin) Repeat 3 items (Leader, Season, Table)    2) Clock draw: NORMAL  3) 3 item recall: Recalls 3 objects  Results: Normal Clock, 3 items recalled: COGNITIVE IMPAIRMENT LESS LIKELY    Mini-CogTM Copyright CHARITY Young. Licensed by the author for use in [facility]; reprinted with permission (koki@.[email or web address]). All rights reserved.      Do you have sleep apnea, excessive snoring or daytime drowsiness?: no        Hyperlipidemia Follow-Up    Are you having any of " the following symptoms? (Select all that apply)  No complaints of shortness of breath, chest pain or pressure.  No increased sweating or nausea with activity.  No left-sided neck or arm pain.  No complaints of pain in calves when walking 1-2 blocks.    Are you regularly taking any medication or supplement to lower your cholesterol?   Yes- reports good compliance    Are you having muscle aches or other side effects that you think could be caused by your cholesterol lowering medication?  No      Hypertension Follow-up    Do you check your blood pressure regularly outside of the clinic? Yes brings log to clinic for review. Has been doing real well with this. Has multiples readings since last year and only 3 were in the 130's systolic. Remainder typically in 108-1teens/60's diastolic so they all look really good.     Walks 3-4x per week.    Are you following a low salt diet? Yes for the most part    Are your blood pressures ever more than 140 on the top number (systolic) OR more   than 90 on the bottom number (diastolic), for example 140/90? Yes     Also requesting refill of kenalog cream for contact dermatitis to poison ivy. Usually will break out in rash once per spring when she is cutting the bushes back. Onset 1 week ago. Couple spots on arms and L lower leg.    Reviewed and updated as needed this visit by clinical staff  Tobacco  Allergies  Meds  Problems  Med Hx  Surg Hx  Fam Hx  Soc Hx          Reviewed and updated as needed this visit by Provider  Tobacco  Allergies  Meds  Problems  Med Hx  Surg Hx  Fam Hx  Soc Hx         Social History     Tobacco Use     Smoking status: Former Smoker     Packs/day: 1.50     Years: 10.00     Pack years: 15.00     Types: Cigarettes     Last attempt to quit: 1991     Years since quittin.8     Smokeless tobacco: Never Used   Substance Use Topics     Alcohol use: No     Alcohol/week: 0.0 oz                           Current providers sharing in care for this  patient include:   Patient Care Team:  Sobeida Motta PA-C as PCP - General (Physician Assistant - Medical)  Sobeida Motta PA-C as Assigned PCP    The following health maintenance items are reviewed in Epic and correct as of today:  Health Maintenance   Topic Date Due     MAMMO SCREENING  2019     BMP  06/15/2019     MICROALBUMIN  06/15/2019     INFLUENZA VACCINE (Season Ended) 2019     MEDICARE ANNUAL WELLNESS VISIT  2020     ADVANCED DIRECTIVE PLANNING  2022     LIPID  2023     COLONOSCOPY  2023     DTAP/TDAP/TD IMMUNIZATION (2 - Td) 2023     DEXA  Completed     HEPATITIS C SCREENING  Completed     PHQ-2  Completed     PNEUMOCOCCAL IMMUNIZATION 65+ LOW/MEDIUM RISK  Completed     ZOSTER IMMUNIZATION  Completed     IPV IMMUNIZATION  Aged Out     MENINGITIS IMMUNIZATION  Aged Out     BP Readings from Last 3 Encounters:   19 130/60   18 116/62   18 95/57    Wt Readings from Last 3 Encounters:   19 51.3 kg (113 lb)   18 50.8 kg (111 lb 14.4 oz)   18 48.5 kg (107 lb)                  Patient Active Problem List   Diagnosis     Advanced directives, counseling/discussion     CARDIOVASCULAR SCREENING; LDL GOAL LESS THAN 160     History of colonic polyps     Osteopenia - repeat in 5 years     Post-menopause     Hyperlipidemia LDL goal <130     Hypertension goal BP (blood pressure) < 130/80     Family history of colon cancer in father - age 82     Past Surgical History:   Procedure Laterality Date     COLONOSCOPY Left 7/3/2018    Procedure: COLONOSCOPY;  COLONOSCOPY;  Surgeon: Manuel Garcia MD;  Location:  GI       Social History     Tobacco Use     Smoking status: Former Smoker     Packs/day: 1.50     Years: 10.00     Pack years: 15.00     Types: Cigarettes     Last attempt to quit: 1991     Years since quittin.8     Smokeless tobacco: Never Used   Substance Use Topics     Alcohol use: No      "Alcohol/week: 0.0 oz     Family History   Problem Relation Age of Onset     Hypertension Father      Colon Cancer Father      Breast Cancer Paternal Grandmother      Diabetes No family hx of      Coronary Artery Disease No family hx of      Hyperlipidemia No family hx of      Cerebrovascular Disease No family hx of          Current Outpatient Medications   Medication Sig Dispense Refill     calcium-vitamin D-vitamin K (VIACTIV) 500-500-40 MG-UNT-MCG CHEW Take 1 tablet by mouth daily Calcium component is 650mg not 500mg as listed above       lisinopril (PRINIVIL/ZESTRIL) 10 MG tablet Take 1 tablet (10 mg) by mouth daily 90 tablet 3     simvastatin (ZOCOR) 10 MG tablet Take 1 tablet (10 mg) by mouth at bedtime 90 tablet 3     triamcinolone (KENALOG) 0.1 % external cream Apply sparingly to affected area three times daily for 14 days. 30 g 0     calcium-vitamin D-vitamin K (VIACTIV) 500-500-40 MG-UNT-MCG CHEW Take 2 tablets by mouth daily  0     No Known Allergies  Pneumonia Vaccine: up to date  Mammogram Screening: Mammogram Screening: Patient over age 50, mutual decision to screen reflected in health maintenance.    ROS:  Constitutional, HEENT, cardiovascular, pulmonary, GI, , musculoskeletal, neuro, skin, endocrine and psych systems are negative, except as otherwise noted.    OBJECTIVE:   /60 (BP Location: Right arm, Cuff Size: Adult Regular)   Pulse 116   Temp 98.2  F (36.8  C) (Oral)   Ht 1.499 m (4' 11\")   Wt 51.3 kg (113 lb)   SpO2 97%   BMI 22.82 kg/m   Estimated body mass index is 22.82 kg/m  as calculated from the following:    Height as of this encounter: 1.499 m (4' 11\").    Weight as of this encounter: 51.3 kg (113 lb).  EXAM:   GENERAL: healthy, alert and no distress  EYES: Eyes grossly normal to inspection, PERRL and conjunctivae and sclerae normal  HENT: ear canals and TM's normal, nose and mouth without ulcers or lesions  NECK: no adenopathy, no asymmetry, masses, or scars and thyroid " normal to palpation  RESP: lungs clear to auscultation - no rales, rhonchi or wheezes  BREAST: deferred  CV: regular rate and rhythm, normal S1 S2, no S3 or S4, no murmur, click or rub, no peripheral edema and peripheral pulses strong  ABDOMEN: soft, nontender, no hepatosplenomegaly, no masses and bowel sounds normal  MS: no gross musculoskeletal defects noted, no edema  SKIN: several small scattered erythematous linear markings and patches with slight scaling of primarily L and R forearms c/w poison ivy dermatitis. no suspicious lesions or rashes  NEURO: Normal strength and tone, mentation intact and speech normal  PSYCH: mentation appears normal, affect normal/bright    Diagnostic Test Results:  Labs reviewed in Epic    ASSESSMENT / PLAN:       ICD-10-CM    1. Healthy adult on routine physical examination Z00.00    2. Hyperlipidemia LDL goal <130 E78.5 simvastatin (ZOCOR) 10 MG tablet     Lipid panel reflex to direct LDL Fasting     Comprehensive metabolic panel   3. Contact dermatitis due to poison ivy L23.7 triamcinolone (KENALOG) 0.1 % external cream   4. Hypertension goal BP (blood pressure) < 130/80 I10 Comprehensive metabolic panel     lisinopril (PRINIVIL/ZESTRIL) 10 MG tablet     Albumin Random Urine Quantitative with Creat Ratio   5. Encounter for screening mammogram for breast cancer Z12.31 *MA Screening Digital Bilateral   6. Osteopenia, unspecified location M85.80    7. Family history of colon cancer in father - age 82 Z80.0    BP looks great - review of BP log all WNL. Continue without changes.  Last lipid profile at goal as well.  Repeat labs, but meds refilled for 1 yr.  Due for next DEXA in 2022 - pt continues on vitamin D and calcium supplementation.  Colonoscopy due 2023.  Up to date on all immunizations.  Did also refill kenalog at request for contact dermatitis to poison ivy that she typically gets 1x per year and responds well to this.    End of Life Planning:  Patient currently has an  "advanced directive: Yes. Brings copy today so will send for abstraction.    COUNSELING:  Reviewed preventive health counseling, as reflected in patient instructions       Regular exercise       Healthy diet/nutrition       Osteoporosis Prevention/Bone Health       Colon cancer screening    Estimated body mass index is 22.82 kg/m  as calculated from the following:    Height as of this encounter: 1.499 m (4' 11\").    Weight as of this encounter: 51.3 kg (113 lb).         reports that she quit smoking about 27 years ago. Her smoking use included cigarettes. She has a 15.00 pack-year smoking history. She has never used smokeless tobacco.      Appropriate preventive services were discussed with this patient, including applicable screening as appropriate for cardiovascular disease, diabetes, osteopenia/osteoporosis, and glaucoma.  As appropriate for age/gender, discussed screening for colorectal cancer, prostate cancer, breast cancer, and cervical cancer. Checklist reviewing preventive services available has been given to the patient.    Reviewed patients plan of care and provided an AVS. The Basic Care Plan (routine screening as documented in Health Maintenance) for Frida meets the Care Plan requirement. This Care Plan has been established and reviewed with the Patient.    Counseling Resources:  ATP IV Guidelines  Pooled Cohorts Equation Calculator  Breast Cancer Risk Calculator  FRAX Risk Assessment  ICSI Preventive Guidelines  Dietary Guidelines for Americans, 2010  USDA's MyPlate  ASA Prophylaxis  Lung CA Screening    Sobeida Motta PA-C  [facility]  "

## 2019-06-12 LAB
ALBUMIN SERPL-MCNC: 3.9 G/DL (ref 3.4–5)
ALP SERPL-CCNC: 82 U/L (ref 40–150)
ALT SERPL W P-5'-P-CCNC: 23 U/L (ref 0–50)
ANION GAP SERPL CALCULATED.3IONS-SCNC: 9 MMOL/L (ref 3–14)
AST SERPL W P-5'-P-CCNC: 18 U/L (ref 0–45)
BILIRUB SERPL-MCNC: 0.5 MG/DL (ref 0.2–1.3)
BUN SERPL-MCNC: 17 MG/DL (ref 7–30)
CALCIUM SERPL-MCNC: 9.1 MG/DL (ref 8.5–10.1)
CHLORIDE SERPL-SCNC: 105 MMOL/L (ref 94–109)
CHOLEST SERPL-MCNC: 163 MG/DL
CO2 SERPL-SCNC: 27 MMOL/L (ref 20–32)
CREAT SERPL-MCNC: 0.64 MG/DL (ref 0.52–1.04)
CREAT UR-MCNC: 56 MG/DL
GFR SERPL CREATININE-BSD FRML MDRD: >90 ML/MIN/{1.73_M2}
GLUCOSE SERPL-MCNC: 95 MG/DL (ref 70–99)
HDLC SERPL-MCNC: 58 MG/DL
LDLC SERPL CALC-MCNC: 89 MG/DL
MICROALBUMIN UR-MCNC: <5 MG/L
MICROALBUMIN/CREAT UR: NORMAL MG/G CR (ref 0–25)
NONHDLC SERPL-MCNC: 105 MG/DL
POTASSIUM SERPL-SCNC: 4 MMOL/L (ref 3.4–5.3)
PROT SERPL-MCNC: 7.4 G/DL (ref 6.8–8.8)
SODIUM SERPL-SCNC: 141 MMOL/L (ref 133–144)
TRIGL SERPL-MCNC: 79 MG/DL

## 2019-06-16 NOTE — RESULT ENCOUNTER NOTE
Please call or write patient with the following results:    Dear Frida,    Your recent lab results are noted below:    -Cholesterol levels are at your goal levels and even look a bit better than last year!  ADVISE: continuing your medication, a regular exercise program with at least 150 minutes of aerobic exercise per week, and eating a low saturated fat/low carbohydrate diet.  Also, you should recheck this fasting cholesterol panel in 12 months.  -Liver and gallbladder tests are normal (ALT,AST, Alk phos, bilirubin), kidney function is normal (Cr, GFR), sodium is normal, potassium is normal, calcium is normal, glucose is normal.  -Microalbumin (urine protein) test is normal.  ADVISE: rechecking this annually.    For additional lab test information, labtestsonline.org is an excellent reference.  Please contact the clinic at [phone] with any further questions or concerns.      Thank you,      Sobeida Motta PA-C  [facility]

## 2019-06-17 ENCOUNTER — DOCUMENTATION ONLY (OUTPATIENT)
Dept: OTHER | Facility: CLINIC | Age: 69
End: 2019-06-17

## 2019-06-17 ENCOUNTER — HOSPITAL ENCOUNTER (OUTPATIENT)
Dept: MAMMOGRAPHY | Facility: CLINIC | Age: 69
Discharge: HOME | End: 2019-06-17
Attending: PHYSICIAN ASSISTANT | Admitting: PHYSICIAN ASSISTANT
Payer: COMMERCIAL

## 2019-06-17 DIAGNOSIS — Z12.31 ENCOUNTER FOR SCREENING MAMMOGRAM FOR BREAST CANCER: ICD-10-CM

## 2019-06-17 PROBLEM — Z71.89 ADVANCED DIRECTIVES, COUNSELING/DISCUSSION: Status: RESOLVED | Noted: 2017-06-05 | Resolved: 2019-06-17

## 2019-06-17 PROCEDURE — 77067 SCR MAMMO BI INCL CAD: CPT

## 2019-06-20 NOTE — RESULT ENCOUNTER NOTE
Result(s) was/were reviewed with pt by radiology and additional imaging scheduled for 6/21.  Electronically Signed By: Sobeida Motta PA-C

## 2019-06-21 ENCOUNTER — HOSPITAL ENCOUNTER (OUTPATIENT)
Dept: ULTRASOUND IMAGING | Facility: CLINIC | Age: 69
End: 2019-06-21
Attending: PHYSICIAN ASSISTANT
Payer: COMMERCIAL

## 2019-06-21 ENCOUNTER — HOSPITAL ENCOUNTER (OUTPATIENT)
Dept: MAMMOGRAPHY | Facility: CLINIC | Age: 69
Discharge: HOME | End: 2019-06-21
Attending: PHYSICIAN ASSISTANT | Admitting: PHYSICIAN ASSISTANT
Payer: COMMERCIAL

## 2019-06-21 DIAGNOSIS — R92.8 ABNORMAL MAMMOGRAM: ICD-10-CM

## 2019-06-21 PROCEDURE — G0279 TOMOSYNTHESIS, MAMMO: HCPCS

## 2019-06-21 PROCEDURE — 76642 ULTRASOUND BREAST LIMITED: CPT | Mod: RT

## 2019-06-28 NOTE — RESULT ENCOUNTER NOTE
Result(s) was/were reviewed with the patient by radiology.  Electronically Signed By: Sobeida Motta PA-C

## 2020-05-13 DIAGNOSIS — E78.5 HYPERLIPIDEMIA LDL GOAL <130: ICD-10-CM

## 2020-05-13 RX ORDER — SIMVASTATIN 10 MG
TABLET ORAL
Qty: 90 TABLET | Refills: 0 | OUTPATIENT
Start: 2020-05-13

## 2020-05-13 NOTE — TELEPHONE ENCOUNTER
[facility] calling to inquire about denial. Advised of note below. Pharmacy stated an understanding and agreed with plan.    Jimy Caruso RN   [facility]

## 2020-05-13 NOTE — TELEPHONE ENCOUNTER
Denied, request is too early. Will route to  as patient will be due for yearly check up and labs in June. Please schedule once clinic is seeing patients mid summer. Kiki Monzon R.N.

## 2020-05-14 RX ORDER — SIMVASTATIN 10 MG
TABLET ORAL
Qty: 30 TABLET | Refills: 0 | Status: SHIPPED | OUTPATIENT
Start: 2020-05-14 | End: 2020-07-17

## 2020-05-14 NOTE — TELEPHONE ENCOUNTER
Medication is being filled for 1 time refill only due to:  Patient needs to be seen because it has been more than one year since last visit.   AWILDA AlonzoN, RN  [facility]

## 2020-07-16 NOTE — PATIENT INSTRUCTIONS
Preventive Health Recommendations    See your health care provider every year to    Review health changes.     Discuss preventive care.      Review your medicines if your doctor has prescribed any.      You no longer need a yearly Pap test unless you've had an abnormal Pap test in the past 10 years. If you have vaginal symptoms, such as bleeding or discharge, be sure to talk with your provider about a Pap test.      Every 1 to 2 years, have a mammogram.  If you are over 69, talk with your health care provider about whether or not you want to continue having screening mammograms.      Every 10 years, have a colonoscopy. Or, have a yearly FIT test (stool test). These exams will check for colon cancer.       Have a cholesterol test every 5 years, or more often if your doctor advises it.       Have a diabetes test (fasting glucose) every three years. If you are at risk for diabetes, you should have this test more often.       At age 65, have a bone density scan (DEXA) to check for osteoporosis (brittle bone disease).    Shots:    Get a flu shot each year.    Get a tetanus shot every 10 years.    Talk to your doctor about your pneumonia vaccines. There are now two you should receive - Pneumovax (PPSV 23) and Prevnar (PCV 13).    Talk to your pharmacist about the shingles vaccine.    Talk to your doctor about the hepatitis B vaccine.    Nutrition:     Eat at least 5 servings of fruits and vegetables each day.      Eat whole-grain bread, whole-wheat pasta and brown rice instead of white grains and rice.      Get adequate about Calcium and Vitamin D.     Lifestyle    Exercise at least 150 minutes a week (30 minutes a day, 5 days a week). This will help you control your weight and prevent disease.      Limit alcohol to one drink per day.      No smoking.       Wear sunscreen to prevent skin cancer.       See your dentist twice a year for an exam and cleaning.      See your eye doctor every 1 to 2 years to screen for  conditions such as glaucoma, macular degeneration, cataracts, etc.    Personalized Prevention Plan  You are due for the preventive services outlined below.  Your care team is available to assist you in scheduling these services.  If you have already completed any of these items, please share that information with your care team to update in your medical record.    Health Maintenance Due   Topic Date Due     ANNUAL REVIEW OF HM ORDERS  1950     PHQ-2  01/01/2020     Annual Wellness Visit  06/11/2020     Basic Metabolic Panel  06/11/2020     Cholesterol Lab  06/11/2020     Kidney Microalbumin Urine Test  06/11/2020     FALL RISK ASSESSMENT  06/11/2020     Mammogram  06/21/2020

## 2020-07-16 NOTE — PROGRESS NOTES
"  SUBJECTIVE:   Frida Iglesias is a 69 year old female who presents for Preventive Visit.    Are you in the first 12 months of your Medicare Part B coverage?  No    Physical Health:    In general, how would you rate your overall physical health? good    Outside of work, how many days during the week do you exercise? 4-5 days/week    Outside of work, approximately how many minutes a day do you exercise?45-60 minutes    If you drink alcohol do you typically have >3 drinks per day or >7 drinks per week? No    Do you usually eat at least 4 servings of fruit and vegetables a day, include whole grains & fiber and avoid regularly eating high fat or \"junk\" foods? Yes    Do you have any problems taking medications regularly?  No    Do you have any side effects from medications? not applicable    Needs assistance for the following daily activities: no assistance needed    Which of the following safety concerns are present in your home?  none identified     Hearing impairment: No    In the past 6 months, have you been bothered by leaking of urine? no    Mental Health:    In general, how would you rate your overall mental or emotional health? good  PHQ-2 Score:      Do you feel safe in your environment? Yes    Have you ever done Advance Care Planning? (For example, a Health Directive, POLST, or a discussion with a medical provider or your loved ones about your wishes): Yes, advance care planning is on file.    Additional concerns to address?  No    Fall risk:  Fallen 2 or more times in the past year?: No  Any fall with injury in the past year?: No    Cognitive Screenin) Repeat 3 items (Leader, Season, Table)    2) Clock draw: NORMAL  3) 3 item recall: Recalls 3 objects  Results: 3 items recalled: COGNITIVE IMPAIRMENT LESS LIKELY    Mini-CogTM Copyright S Hector. Licensed by the author for use in [facility]; reprinted with permission (koki@.[email or web address]). All rights reserved.      Do you have sleep apnea, excessive " snoring or daytime drowsiness?: no      Hyperlipidemia Follow-Up      Are you regularly taking any medication or supplement to lower your cholesterol?   Yes- simvasatin 10 mg     Are you having muscle aches or other side effects that you think could be caused by your cholesterol lowering medication?  No    Hypertension Follow-up      Do you check your blood pressure regularly outside of the clinic? Yes 3 times per week; brings calendar log for review showing readings 3-5x per week. Generally in the 100's-1teens systolic over 50-70's diastolic. No elevated pressures. Admits she feels she always has a little white coat HTN as more elevated when she comes to clinic and then always improves on recheck.    Are you following a low salt diet? Yes    Are your blood pressures ever more than 140 on the top number (systolic) OR more   than 90 on the bottom number (diastolic), for example 140/90? No     Moved to [place], [place] - put a road in by her old house so forced to move as a result of this. Son bought a house so moved in with him. Was going to re-establish care at her clinic there, but it was closed due to COVID19 pandemic so drove 25-30min here.      Reviewed and updated as needed this visit by clinical staff  Tobacco  Allergies  Meds  Problems  Med Hx  Surg Hx  Fam Hx  Soc Hx          Reviewed and updated as needed this visit by Provider  Tobacco  Allergies  Meds  Problems  Med Hx  Surg Hx  Fam Hx  Soc Hx         Social History     Tobacco Use     Smoking status: Former Smoker     Packs/day: 1.50     Years: 10.00     Pack years: 15.00     Types: Cigarettes     Last attempt to quit: 1991     Years since quittin.9     Smokeless tobacco: Never Used   Substance Use Topics     Alcohol use: No     Alcohol/week: 0.0 standard drinks                           Current providers sharing in care for this patient include:   Patient Care Team:  Sobeida Motta PA-C as PCP - General (Physician  Assistant - Medical)  Sobeida Motta PA-C as Assigned PCP    The following health maintenance items are reviewed in Epic and correct as of today:  Health Maintenance   Topic Date Due     ANNUAL REVIEW OF HM ORDERS  1950     CMP  2020     LIPID  2020     MICROALBUMIN  2020     FALL RISK ASSESSMENT  2020     MAMMO SCREENING  2020     INFLUENZA VACCINE (1) 2020     MEDICARE ANNUAL WELLNESS VISIT  2021     COLORECTAL CANCER SCREENING  2023     DTAP/TDAP/TD IMMUNIZATION (2 - Td) 2023     ADVANCE CARE PLANNING  2025     DEXA  Completed     HEPATITIS C SCREENING  Completed     PHQ-2  Completed     PNEUMOCOCCAL IMMUNIZATION 65+ LOW/MEDIUM RISK  Completed     ZOSTER IMMUNIZATION  Completed     IPV IMMUNIZATION  Aged Out     MENINGITIS IMMUNIZATION  Aged Out     HEPATITIS B IMMUNIZATION  Aged Out     BP Readings from Last 3 Encounters:   20 112/70   19 130/60   18 116/62    Wt Readings from Last 3 Encounters:   20 51.7 kg (114 lb)   19 51.3 kg (113 lb)   18 50.8 kg (111 lb 14.4 oz)                  Patient Active Problem List   Diagnosis     CARDIOVASCULAR SCREENING; LDL GOAL LESS THAN 160     History of colonic polyps     Osteopenia - repeat in 5 years     Post-menopause     Hyperlipidemia LDL goal <130     Hypertension goal BP (blood pressure) < 130/80     Family history of colon cancer in father - age 82     Past Surgical History:   Procedure Laterality Date     COLONOSCOPY Left 7/3/2018    Procedure: COLONOSCOPY;  COLONOSCOPY;  Surgeon: Manuel Garcia MD;  Location:  GI       Social History     Tobacco Use     Smoking status: Former Smoker     Packs/day: 1.50     Years: 10.00     Pack years: 15.00     Types: Cigarettes     Last attempt to quit: 1991     Years since quittin.9     Smokeless tobacco: Never Used   Substance Use Topics     Alcohol use: No     Alcohol/week: 0.0 standard drinks      "Family History   Problem Relation Age of Onset     Hypertension Father      Colon Cancer Father      Breast Cancer Paternal Grandmother      Diabetes No family hx of      Coronary Artery Disease No family hx of      Hyperlipidemia No family hx of      Cerebrovascular Disease No family hx of          Current Outpatient Medications   Medication Sig Dispense Refill     calcium-vitamin D-vitamin K (VIACTIV) 500-500-40 MG-UNT-MCG CHEW Take 1 tablet by mouth daily Calcium component is 650mg not 500mg as listed above       calcium-vitamin D-vitamin K (VIACTIV) 500-500-40 MG-UNT-MCG CHEW Take 2 tablets by mouth daily  0     lisinopril (ZESTRIL) 10 MG tablet take 1 tablet by mouth daily.    90 tablet 3     simvastatin (ZOCOR) 10 MG tablet Take 1 tablet (10 mg) by mouth at bedtime 90 tablet 3     No Known Allergies  Pneumonia Vaccine: up to date  Mammogram Screening: Mammogram Screening: Patient over age 50, mutual decision to screen reflected in health maintenance.    ROS:  Constitutional, HEENT, cardiovascular, pulmonary, GI, , musculoskeletal, neuro, skin, endocrine and psych systems are negative, except as otherwise noted.    OBJECTIVE:   /70   Pulse 90   Temp 98.7  F (37.1  C) (Tympanic)   Ht 1.499 m (4' 11\")   Wt 51.7 kg (114 lb)   SpO2 94%   BMI 23.03 kg/m   Estimated body mass index is 23.03 kg/m  as calculated from the following:    Height as of this encounter: 1.499 m (4' 11\").    Weight as of this encounter: 51.7 kg (114 lb).  EXAM:   GENERAL: healthy, alert and no distress  EYES: Eyes grossly normal to inspection, PERRL and conjunctivae and sclerae normal  HENT: ear canals and TM's normal, nose and mouth without ulcers or lesions  NECK: no adenopathy, no asymmetry, masses, or scars and thyroid normal to palpation  RESP: lungs clear to auscultation - no rales, rhonchi or wheezes  BREAST: normal without masses, tenderness or nipple discharge and no palpable axillary masses or adenopathy  CV: regular " "rate and rhythm, normal S1 S2, no S3 or S4, no murmur, click or rub, no peripheral edema and peripheral pulses strong  ABDOMEN: soft, nontender, no hepatosplenomegaly, no masses and bowel sounds normal  MS: no gross musculoskeletal defects noted, no edema  SKIN: no suspicious lesions or rashes  NEURO: Normal strength and tone, mentation intact and speech normal  PSYCH: mentation appears normal, affect normal/bright    Diagnostic Test Results:  Labs reviewed in Epic    ASSESSMENT / PLAN:       ICD-10-CM    1. Routine general medical examination at a health care facility  Z00.00    2. Hypertension goal BP (blood pressure) < 130/80  I10 Comprehensive metabolic panel   3. Hyperlipidemia LDL goal <130  E78.5 Lipid panel reflex to direct LDL Fasting     Albumin Random Urine Quantitative with Creat Ratio     simvastatin (ZOCOR) 10 MG tablet   4. Osteopenia, unspecified location - due for DEXA in 2022  M85.80    5. Encounter for screening mammogram for breast cancer  Z12.31 MA Screen Bilateral w/Harsha   BP and lipids stable.  meds refilled.  Repeat labs for monitoring and notify of results when available.    COUNSELING:  Reviewed preventive health counseling, as reflected in patient instructions       Regular exercise       Healthy diet/nutrition       Fall risk prevention       Osteoporosis Prevention/Bone Health    Estimated body mass index is 23.03 kg/m  as calculated from the following:    Height as of this encounter: 1.499 m (4' 11\").    Weight as of this encounter: 51.7 kg (114 lb).         reports that she quit smoking about 28 years ago. Her smoking use included cigarettes. She has a 15.00 pack-year smoking history. She has never used smokeless tobacco.      Appropriate preventive services were discussed with this patient, including applicable screening as appropriate for cardiovascular disease, diabetes, osteopenia/osteoporosis, and glaucoma.  As appropriate for age/gender, discussed screening for colorectal cancer, " prostate cancer, breast cancer, and cervical cancer. Checklist reviewing preventive services available has been given to the patient.    Reviewed patients plan of care and provided an AVS. The Basic Care Plan (routine screening as documented in Health Maintenance) for Frida meets the Care Plan requirement. This Care Plan has been established and reviewed with the Patient.    Counseling Resources:  ATP IV Guidelines  Pooled Cohorts Equation Calculator  Breast Cancer Risk Calculator  FRAX Risk Assessment  ICSI Preventive Guidelines  Dietary Guidelines for Americans, 2010  USDA's MyPlate  ASA Prophylaxis  Lung CA Screening    Sobeida Motta PA-C  [facility]

## 2020-07-17 ENCOUNTER — OFFICE VISIT (OUTPATIENT)
Dept: FAMILY MEDICINE | Facility: CLINIC | Age: 70
End: 2020-07-17
Payer: COMMERCIAL

## 2020-07-17 VITALS
TEMPERATURE: 98.7 F | HEIGHT: 59 IN | HEART RATE: 90 BPM | WEIGHT: 114 LBS | SYSTOLIC BLOOD PRESSURE: 112 MMHG | OXYGEN SATURATION: 94 % | DIASTOLIC BLOOD PRESSURE: 70 MMHG | BODY MASS INDEX: 22.98 KG/M2

## 2020-07-17 DIAGNOSIS — I10 HYPERTENSION GOAL BP (BLOOD PRESSURE) < 130/80: ICD-10-CM

## 2020-07-17 DIAGNOSIS — E78.5 HYPERLIPIDEMIA LDL GOAL <130: ICD-10-CM

## 2020-07-17 DIAGNOSIS — Z00.00 ROUTINE GENERAL MEDICAL EXAMINATION AT A HEALTH CARE FACILITY: Primary | ICD-10-CM

## 2020-07-17 DIAGNOSIS — Z12.31 ENCOUNTER FOR SCREENING MAMMOGRAM FOR BREAST CANCER: ICD-10-CM

## 2020-07-17 DIAGNOSIS — M85.80 OSTEOPENIA, UNSPECIFIED LOCATION: ICD-10-CM

## 2020-07-17 PROCEDURE — 80053 COMPREHEN METABOLIC PANEL: CPT | Performed by: PHYSICIAN ASSISTANT

## 2020-07-17 PROCEDURE — 36415 COLL VENOUS BLD VENIPUNCTURE: CPT | Performed by: PHYSICIAN ASSISTANT

## 2020-07-17 PROCEDURE — 80061 LIPID PANEL: CPT | Performed by: PHYSICIAN ASSISTANT

## 2020-07-17 PROCEDURE — 82043 UR ALBUMIN QUANTITATIVE: CPT | Performed by: PHYSICIAN ASSISTANT

## 2020-07-17 PROCEDURE — 99397 PER PM REEVAL EST PAT 65+ YR: CPT | Performed by: PHYSICIAN ASSISTANT

## 2020-07-17 RX ORDER — SIMVASTATIN 10 MG
TABLET ORAL
Qty: 90 TABLET | Refills: 3 | Status: SHIPPED | OUTPATIENT
Start: 2020-07-17 | End: 2021-06-29

## 2020-07-17 ASSESSMENT — MIFFLIN-ST. JEOR: SCORE: 947.73

## 2020-07-17 NOTE — LETTER
July 24, 2020      Frida Iglesias  [address] [place], [place] [id number]        Dear ,    We are writing to inform you of your test results.    -Cholesterol levels are at your goal levels.  ADVISE: continuing your medication, a regular exercise program with at least 150 minutes of aerobic exercise per week, and eating a low saturated fat/low carbohydrate diet.  Also, you should recheck this fasting cholesterol panel in 12 months.   -Liver and gallbladder tests are normal (ALT,AST, Alk phos, bilirubin), kidney function is normal (Cr, GFR), sodium is normal, potassium is normal, calcium is normal, glucose is normal.   -Microalbumin (urine protein) test is normal.  ADVISE: rechecking this annually.       Everything looks great! Happy Birthday coming up, Frida! I hope you have a sonia day!     Resulted Orders   Lipid panel reflex to direct LDL Fasting   Result Value Ref Range    Cholesterol 174 <200 mg/dL    Triglycerides 90 <150 mg/dL      Comment:      Fasting specimen    HDL Cholesterol 59 >49 mg/dL    LDL Cholesterol Calculated 97 <100 mg/dL      Comment:      Desirable:       <100 mg/dl    Non HDL Cholesterol 115 <130 mg/dL   Comprehensive metabolic panel   Result Value Ref Range    Sodium 138 133 - 144 mmol/L    Potassium 3.8 3.4 - 5.3 mmol/L    Chloride 104 94 - 109 mmol/L    Carbon Dioxide 26 20 - 32 mmol/L    Anion Gap 8 3 - 14 mmol/L    Glucose 95 70 - 99 mg/dL      Comment:      Fasting specimen    Urea Nitrogen 14 7 - 30 mg/dL    Creatinine 0.64 0.52 - 1.04 mg/dL    GFR Estimate >90 >60 mL/min/[1.73_m2]      Comment:      Non  GFR Calc  Starting 12/18/2018, serum creatinine based estimated GFR (eGFR) will be   calculated using the Chronic Kidney Disease Epidemiology Collaboration   (CKD-EPI) equation.      GFR Estimate If Black >90 >60 mL/min/[1.73_m2]      Comment:       GFR Calc  Starting 12/18/2018, serum creatinine based estimated GFR (eGFR) will be    calculated using the Chronic Kidney Disease Epidemiology Collaboration   (CKD-EPI) equation.      Calcium 9.1 8.5 - 10.1 mg/dL    Bilirubin Total 0.6 0.2 - 1.3 mg/dL    Albumin 4.1 3.4 - 5.0 g/dL    Protein Total 7.8 6.8 - 8.8 g/dL    Alkaline Phosphatase 80 40 - 150 U/L    ALT 19 0 - 50 U/L    AST 17 0 - 45 U/L   Albumin Random Urine Quantitative with Creat Ratio   Result Value Ref Range    Creatinine Urine 183 mg/dL    Albumin Urine mg/L 16 mg/L    Albumin Urine mg/g Cr 8.52 0 - 25 mg/g Cr       If you have any questions or concerns, please call the clinic at the number listed above.       Sincerely,        Sobeida Motta PA-C

## 2020-07-18 LAB
ALBUMIN SERPL-MCNC: 4.1 G/DL (ref 3.4–5)
ALP SERPL-CCNC: 80 U/L (ref 40–150)
ALT SERPL W P-5'-P-CCNC: 19 U/L (ref 0–50)
ANION GAP SERPL CALCULATED.3IONS-SCNC: 8 MMOL/L (ref 3–14)
AST SERPL W P-5'-P-CCNC: 17 U/L (ref 0–45)
BILIRUB SERPL-MCNC: 0.6 MG/DL (ref 0.2–1.3)
BUN SERPL-MCNC: 14 MG/DL (ref 7–30)
CALCIUM SERPL-MCNC: 9.1 MG/DL (ref 8.5–10.1)
CHLORIDE SERPL-SCNC: 104 MMOL/L (ref 94–109)
CHOLEST SERPL-MCNC: 174 MG/DL
CO2 SERPL-SCNC: 26 MMOL/L (ref 20–32)
CREAT SERPL-MCNC: 0.64 MG/DL (ref 0.52–1.04)
CREAT UR-MCNC: 183 MG/DL
GFR SERPL CREATININE-BSD FRML MDRD: >90 ML/MIN/{1.73_M2}
GLUCOSE SERPL-MCNC: 95 MG/DL (ref 70–99)
HDLC SERPL-MCNC: 59 MG/DL
LDLC SERPL CALC-MCNC: 97 MG/DL
MICROALBUMIN UR-MCNC: 16 MG/L
MICROALBUMIN/CREAT UR: 8.52 MG/G CR (ref 0–25)
NONHDLC SERPL-MCNC: 115 MG/DL
POTASSIUM SERPL-SCNC: 3.8 MMOL/L (ref 3.4–5.3)
PROT SERPL-MCNC: 7.8 G/DL (ref 6.8–8.8)
SODIUM SERPL-SCNC: 138 MMOL/L (ref 133–144)
TRIGL SERPL-MCNC: 90 MG/DL

## 2020-07-23 NOTE — RESULT ENCOUNTER NOTE
Please call or write patient with the following results:    Dear Frida,    Your recent lab results are noted below:    -Cholesterol levels are at your goal levels.  ADVISE: continuing your medication, a regular exercise program with at least 150 minutes of aerobic exercise per week, and eating a low saturated fat/low carbohydrate diet.  Also, you should recheck this fasting cholesterol panel in 12 months.  -Liver and gallbladder tests are normal (ALT,AST, Alk phos, bilirubin), kidney function is normal (Cr, GFR), sodium is normal, potassium is normal, calcium is normal, glucose is normal.  -Microalbumin (urine protein) test is normal.  ADVISE: rechecking this annually.    Everything looks great! Happy Birthday coming up, Frida! I hope you have a sonia day!    For additional lab test information, labtestsonline.org is an excellent reference.  Please contact the clinic at [phone] with any further questions or concerns.      Thank you,      Sobeida Motta PA-C  [facility]

## 2020-08-11 ENCOUNTER — HOSPITAL ENCOUNTER (OUTPATIENT)
Dept: MAMMOGRAPHY | Facility: CLINIC | Age: 70
Discharge: HOME | End: 2020-08-11
Attending: PHYSICIAN ASSISTANT | Admitting: PHYSICIAN ASSISTANT
Payer: COMMERCIAL

## 2020-08-11 DIAGNOSIS — Z12.31 ENCOUNTER FOR SCREENING MAMMOGRAM FOR BREAST CANCER: ICD-10-CM

## 2020-08-11 PROCEDURE — 77063 BREAST TOMOSYNTHESIS BI: CPT

## 2020-08-11 NOTE — LETTER
[facility]                    August 12, 2020    Frida Iglesias  [address] [place] [id number]      Dear Frida,    Here is a summary of your recent test results:    Mammogram was normal.  Please repeat Mammogram anually  .            Thank you very much for trusting [facility].     Healthy regards,    Sobeida Motta PA-C          Results for orders placed or performed during the hospital encounter of 08/11/20   MA Screen Bilateral w/Harsha     Status: None    Narrative    Examination: Bilateral digital screening mammography with computer  aided detection including digital breast tomosynthesis, 8/11/2020  11:10 AM.    Comparison: 6/17/2019, 6/13/2017    History: No current breast concerns. Grandmother with breast cancer.    BREAST DENSITY: Scattered fibroglandular densities.    COMMENTS:  No suspicious finding.      Impression    IMPRESSION: BI-RADS CATEGORY: 1 -  NEGATIVE.    RECOMMENDED FOLLOW-UP: Annual Mammography.      The patient will be notified of the results.     JOSHUA SUE MD

## 2020-08-12 NOTE — RESULT ENCOUNTER NOTE
Please call or write patient with the following results:    Dear Frida,    Your recent lab results are noted below:    -Mammogram was normal.  ADVISE: rechecking in 1 year.    For additional lab test information, [email or web address] is an excellent reference.  Please contact the clinic at [phone] with any further questions or concerns.      Thank you,      Sobeida Motta PA-C  [facility]

## 2023-10-15 ENCOUNTER — NURSE TRIAGE (OUTPATIENT)
Dept: NURSING | Facility: CLINIC | Age: 73
End: 2023-10-15
Payer: COMMERCIAL

## 2023-10-15 NOTE — TELEPHONE ENCOUNTER
Nurse Triage SBAR    Is this a 2nd Level Triage? NO    Situation: Bilateral leg swelling.    Background: Patient reports she was discharged from the hospital today after colon surgery. After she took a shower, she noticed that both of her legs and feet were swollen. She reports she didn't notice swelling while in the hospital because she had TEDS stockings on. Denies any Hx CHF.    Assessment: Reports bilateral feet and ankle are swollen as well as bilateral knees and thighs. Denies swelling to bilateral lower legs. Denies any pain or indentation to swollen areas. Reports she is urinating well. Denies any difficulty breathing at rest or with exertion. Denies any weeping fluids.    Protocol Recommended Disposition:   See PCP Within 24 Hours    Recommendation: Recommendation is to see PCP within 24 hours. PCP is not with [facility]. Gave care advice and call back instructions. Advised her to call clinic where PCP is located in the morning to make an appt to be seen, or go to . Patient plans to follow advice.     Not established.    Does the patient meet one of the following criteria for ADS visit consideration? 16+ years old, no PCP (internal or external) but seen at [facility]     TIP  Providers, please consider if this condition is appropriate for management at one of our Acute and Diagnostic Services sites.     If patient is a good candidate, please use dotphrase <dot>triageresponse and select Refer to ADS to document.        Reason for Disposition   [1] MODERATE leg swelling (e.g., swelling extends up to knees) AND [2] new-onset or worsening    Additional Information   Negative: SEVERE difficulty breathing (e.g., struggling for each breath, speaks in single words)   Negative: Looks like a broken bone or dislocated joint (e.g., crooked or deformed)   Negative: Sounds like a life-threatening emergency to the triager   Negative: Chest pain   Negative: Followed a leg injury   Negative: [1] Followed an insect bite  AND [2] localized swelling (e.g., small area of puffy or swollen skin)   Negative: Swelling of one ankle joint   Negative: Swelling of knee is main symptom   Negative: Pregnant   Negative: Postpartum (from 0 to 6 weeks after delivery)   Negative: [1] Heart failure suspected (e.g., ankle swelling, shortness of breath, weight gain) AND [2] recently in hospital for heart failure   Negative: Difficulty breathing at rest   Negative: Entire foot is cool or blue in comparison to other side   Negative: [1] Can't walk or can barely walk AND [2] new-onset   Negative: [1] Difficulty breathing with exertion (e.g., walking) AND [2] new-onset or worsening   Negative: [1] Red area or streak AND [2] fever   Negative: [1] Swelling is painful to touch AND [2] fever   Negative: [1] Cast on leg or ankle AND [2] now increased pain   Negative: Patient sounds very sick or weak to the triager   Negative: SEVERE leg swelling (e.g., swelling extends above knee, entire leg is swollen, weeping fluid)   Negative: [1] Red area or streak [2] large (> 2 in. or 5 cm)   Negative: [1] Thigh or calf pain AND [2] only 1 side AND [3] present > 1 hour   Negative: [1] Thigh, calf, or ankle swelling AND [2] only 1 side   Negative: [1] Thigh, calf, or ankle swelling AND [2] bilateral AND [3] 1 side is more swollen    Protocols used: Leg Swelling and Edema-A-AH

## (undated) DEVICE — KIT ENDO TURNOVER/PROCEDURE W/CLEAN A SCOPE LINERS 103888

## (undated) RX ORDER — FENTANYL CITRATE 50 UG/ML
INJECTION, SOLUTION INTRAMUSCULAR; INTRAVENOUS
Status: DISPENSED
Start: 2018-07-03